# Patient Record
Sex: FEMALE | Race: BLACK OR AFRICAN AMERICAN | NOT HISPANIC OR LATINO | URBAN - METROPOLITAN AREA
[De-identification: names, ages, dates, MRNs, and addresses within clinical notes are randomized per-mention and may not be internally consistent; named-entity substitution may affect disease eponyms.]

---

## 2023-06-24 LAB
CHLAMYDIA TRACH., AMPLIFIED: NEGATIVE
N. GONORRHEA, AMPLIFIED: NEGATIVE
TRICHOMONAS VAGINALIS: NEGATIVE

## 2024-05-21 ENCOUNTER — OFFICE VISIT (OUTPATIENT)
Dept: PRIMARY CARE | Facility: CLINIC | Age: 29
End: 2024-05-21
Payer: COMMERCIAL

## 2024-05-21 VITALS
OXYGEN SATURATION: 98 % | TEMPERATURE: 97.2 F | HEART RATE: 90 BPM | SYSTOLIC BLOOD PRESSURE: 111 MMHG | DIASTOLIC BLOOD PRESSURE: 75 MMHG | HEIGHT: 68 IN | BODY MASS INDEX: 22.73 KG/M2 | WEIGHT: 150 LBS

## 2024-05-21 DIAGNOSIS — Z00.00 HEALTHCARE MAINTENANCE: Primary | ICD-10-CM

## 2024-05-21 DIAGNOSIS — J30.2 SEASONAL ALLERGIES: ICD-10-CM

## 2024-05-21 PROBLEM — R87.610 ASCUS WITH POSITIVE HIGH RISK HPV CERVICAL: Status: RESOLVED | Noted: 2024-05-21 | Resolved: 2024-05-21

## 2024-05-21 PROBLEM — R87.810 ASCUS WITH POSITIVE HIGH RISK HPV CERVICAL: Status: RESOLVED | Noted: 2024-05-21 | Resolved: 2024-05-21

## 2024-05-21 PROCEDURE — 1036F TOBACCO NON-USER: CPT | Performed by: STUDENT IN AN ORGANIZED HEALTH CARE EDUCATION/TRAINING PROGRAM

## 2024-05-21 PROCEDURE — 99395 PREV VISIT EST AGE 18-39: CPT | Performed by: STUDENT IN AN ORGANIZED HEALTH CARE EDUCATION/TRAINING PROGRAM

## 2024-05-21 RX ORDER — NORELGESTROMIN AND ETHINYL ESTRADIOL 150; 35 UG/D; UG/D
1 PATCH TRANSDERMAL
COMMUNITY

## 2024-05-21 RX ORDER — LORATADINE 10 MG/1
10 TABLET ORAL DAILY
Qty: 30 TABLET | Refills: 11 | Status: SHIPPED | OUTPATIENT
Start: 2024-05-21 | End: 2025-05-21

## 2024-05-21 ASSESSMENT — PAIN SCALES - GENERAL: PAINLEVEL: 0-NO PAIN

## 2024-05-21 NOTE — PROGRESS NOTES
"Subjective   Patient ID: Charley Saravia is a 28 y.o. female who presents for No chief complaint on file..    HPI   Life/social: Has been about 3 years since last in.  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 Now 2 children, Donovan age 10, Fatou age 2. Has cut back substantially on EtOH. Not smoking. Reports that she cut back drastically on her MJ use. Regular exercise.     Re: seasonal allergies - requesting a script for antihistamine as her allergies are flaring here in the spring. Has upcoming travel to [place] for her birthday and she wants her allergies under control prior to her trip.    Re: HM - Mamm not yet due. CRS not yet due. Declines shots.     PMHx, FHx, Social Hx, Surg Hx personally reviewed at this appointment. No pertinent findings and/or changes from prior (if applicable).    ROS: Denies wt gain/loss f/c HA LoC CP SOB NVDC. See HPI above, and scanned sheet (if applicable). All other systems are reviewed and are without complaint.     Review of Systems    Objective   /75   Pulse 90   Temp 36.2 °C (97.2 °F)   Ht 1.727 m (5' 8\")   Wt 68 kg (150 lb)   SpO2 98%   BMI 22.81 kg/m²     Physical Exam  Gen: well developed in NAD. AAO x3.  HEENT: NC/AT. Anicteric sclera, symmetric pupils. MMM no thrush. Mild cerumen buildup in L ear, < 25% blocked however.   Neck: Soft, supple. No LAD. No goiter.   CV: RRR nl s1s2 no m/r/g  Pulm: CTAB no w/r/r, good air exchange  GI: soft NTND BS+ no hsm  Ext: WWP no edema  Neuro: II-XII grossly intact, nonfocal systemic findings  MSK: 5/5 strength b/l UE and LE  Gait: unremarkable    Lab Results   Component Value Date    WBC 8.6 09/18/2021    HGB 11.9 (L) 09/18/2021    HCT 35.0 (L) 09/18/2021     (L) 09/18/2021    CHOL 190 07/17/2019    TRIG 60 07/17/2019    HDL 66.7 07/17/2019    ALT 13 07/17/2019    AST 16 07/17/2019     07/17/2019    K 3.9 07/17/2019     07/17/2019    CREATININE 0.92 07/17/2019    BUN 9 07/17/2019 "    CO2 24 2019    TSH 0.87 2019    INR 0.9 2021     par    US FETAL BIOPHYSICAL PROFILE WO NON STRESS TESTING  Indication  ========     Growth for Suspected Problem with Fetal Growth, Previous    Section, Abnormal Ultrasound  Finding on Current or Previous Ultrasound     History  ======     General History  Rhesus:  Rh positive  Smoking:  No  Height  170 cm  Height (ft)  5 ft  Height (in)  7 in  Previous Outcomes    3  Para  1  Gracia children born living (T)  1  Gracia children born (T)  1  Gracia children born (P)  1  Gracia living children (L)  2  Gracia children born living (P)  1  Other:  Previous  Section     Maternal Assessment  =================     Height  170 cm  Height (ft)  5 ft  Height (in)  7 in  Weight  73 kg  Weight (lb)  161 lb  Weight gain  0 kg  Weight gain (lb)  0 lb  BMI  25.21 kg/m?  Physical Exam  Initial weight (lb)  161 lb     Pregnancy  =========     Gracia pregnancy. Number of fetuses: 1     Dating  ======     Cycle:  irregular cycle, LMP date uncertain  Conception:  LMP  Ultrasound examination on:  2021  GA by U/S based upon:  AC, BPD, Femur, HC  GA by U/S  30 w + 6 d  CHANELLE by U/S:  2021  Assigned:  based on ultrasound (CRL), selected on 02/10/2021  Assigned GA  31 w + 0 d  Assigned CHANELLE:  2021  Pregnancy length  280 d     Fetal Growth Overview  =================     Exam date        GA              BPD (mm)          HC (mm)                AC (mm)               FL  (mm)             HL (mm)            EFW (g)  2021        19w 0d        43.2     54%        161.0    38%          138.8     56%        31.7     74%        31.0     84%        296    74%  2021        28w 0d        69.2     32%        253.3    10%          240.4     53%        57.7     90%                                  1,310    73%  2021        31w 0d        74.9     14%        273.5    3%            270.6    51%         62.2     70%                                  1,760     52%     Impression  =========     Follow up evaluation performed. The patient had a 26-week    due to P PROM and  chorioamnionitis. There was a question of a mild pericardial effusion   on the last  evaluation.  -Normal interval fetal growth  -No malformations were identified on a limited survey  - There is no evidence of a significant pericardial effusion.  The patient is aware of the above information.  Thank you for allowing us to participate in the care of your patient.     Follow-up  ========     Follow-up as clinically indicated     General Evaluation  ==============     Cardiac activity present.  bpm.  Fetal movements visualized.  Presentation cephalic.  Placenta Placental site: posterior, No Previa Seen.  Umbilical cord Cord vessels: 3 vessel cord.  Amniotic fluid MVP 4.7 cm. TINO 13.3 cm. Q1 2.4 cm, Q2 3.8 cm, Q3 4.7   cm, Q4 2.4 cm.     Fetal Biometry  ============     Standard  BPD  74.9 mm  30w 0d        14%        Hadlock     OFD  96.9 mm          16%        INTERGROWTH-21st     HC  273.5 mm  29w 6d        3%        Hadlock     Cerebellum tr  38.4 mm  31w 2d        38%        Hill     AC  270.6 mm  31w 1d        51%        Hadlock     Femur  62.2 mm  32w 2d        70%        Hadlock     HC / AC  1.01     EFW  1,760 g  31w 0d        52%        Hadlock     EFW (lb)  3 lb  EFW (oz)  14 oz  EFW by:  Hadlock (BPD-AC-FL)  Extended    3.3 mm  Head / Face / Neck  Cephalic index  0.77          27%        Nicolaides     Extremities / Bony Struc  FL / BPD  0.83     FL / HC  0.23     FL / AC  0.23     Other Structures  FHR  147 bpm     Fetal Anatomy  ===========     Cranium:  Normal  Lateral ventricles:  Normal  Midline falx:  Normal  Cavum septi pellucidi:  Normal  Cerebellum:  Normal  Cisterna magna:  Normal  Head / Neck  Rt lateral ventricle:  Normal  Lt lateral ventricle:  Normal  Thalami:  Normal  Cerebellar lobes:  Normal  Lips:  Normal  Nose:   Normal  4-chamber view:  abnormal  RVOT view:  Normal  LVOT view:  abnormal  Heart / Thorax  4-chamber view:  VSD  LVOT:  VSD  Situs:  Normal  3-vessel view:  Normal  Cardiac axis:  Normal  Cardiac size:  Normal  Cardiac rhythm:  Normal  Diaphragm:  Normal  Stomach:  Normal  Kidneys:  Normal  Bladder:  visualized  Abdomen  Stomach:  correct situs  Rt kidney:  Normal  Lt kidney:  Normal  Large bowel:  Normal  Gender:  female  Wants to know gender:  yes     Biophysical Profile  ==============     2: Fetal breathing movements  2: Gross body movements  2: Fetal tone  2: Amniotic fluid volume   Biophysical profile score  Interpretation: normal     Maternal Structures  ===============     Uterus / Cervix  Uterus:  Visualized  Cervix:  Not visualized  Ovaries / Tubes / Adnexa  Rt ovary:  Visualized  Rt ovary details:  Normal  Lt ovary:  Visualized  Lt ovary details:  Normal     Method  ======     Transabdominal ultrasound examination. View: Suboptimal view: limited   by late gestational age  US OB FOLLOW UP TRANSABDOMINAL APPROACH  Indication  ========     Growth for Suspected Problem with Fetal Growth, Previous    Section, Abnormal Ultrasound  Finding on Current or Previous Ultrasound     History  ======     General History  Rhesus:  Rh positive  Smoking:  No  Height  170 cm  Height (ft)  5 ft  Height (in)  7 in  Previous Outcomes    3  Para  1  Gracia children born living (T)  1  Gracia children born (T)  1  Gracia children born (P)  1  Gracia living children (L)  2  Gracia children born living (P)  1  Other:  Previous  Section     Maternal Assessment  =================     Height  170 cm  Height (ft)  5 ft  Height (in)  7 in  Weight  73 kg  Weight (lb)  161 lb  Weight gain  0 kg  Weight gain (lb)  0 lb  BMI  25.21 kg/m?  Physical Exam  Initial weight (lb)  161 lb     Pregnancy  =========     Gracia pregnancy. Number of fetuses: 1     Dating  ======     Cycle:  irregular cycle,  LMP date uncertain  Conception:  LMP  Ultrasound examination on:  2021  GA by U/S based upon:  AC, BPD, Femur, HC  GA by U/S  30 w + 6 d  CHANELLE by U/S:  2021  Assigned:  based on ultrasound (CRL), selected on 02/10/2021  Assigned GA  31 w + 0 d  Assigned CHANELLE:  2021  Pregnancy length  280 d     Fetal Growth Overview  =================     Exam date        GA              BPD (mm)          HC (mm)                AC (mm)               FL  (mm)             HL (mm)            EFW (g)  2021        19w 0d        43.2     54%        161.0    38%          138.8     56%        31.7     74%        31.0     84%        296    74%  2021        28w 0d        69.2     32%        253.3    10%          240.4     53%        57.7     90%                                  1,310    73%  2021        31w 0d        74.9     14%        273.5    3%            270.6    51%         62.2     70%                                 1,760     52%     Impression  =========     Follow up evaluation performed. The patient had a 26-week    due to P PROM and  chorioamnionitis. There was a question of a mild pericardial effusion   on the last  evaluation.  -Normal interval fetal growth  -No malformations were identified on a limited survey  - There is no evidence of a significant pericardial effusion.  The patient is aware of the above information.  Thank you for allowing us to participate in the care of your patient.     Follow-up  ========     Follow-up as clinically indicated     General Evaluation  ==============     Cardiac activity present.  bpm.  Fetal movements visualized.  Presentation cephalic.  Placenta Placental site: posterior, No Previa Seen.  Umbilical cord Cord vessels: 3 vessel cord.  Amniotic fluid MVP 4.7 cm. TINO 13.3 cm. Q1 2.4 cm, Q2 3.8 cm, Q3 4.7   cm, Q4 2.4 cm.     Fetal Biometry  ============     Standard  BPD  74.9 mm  30w 0d        14%        Hadlock     OFD  96.9 mm          16%         INTERGROWTH-21st     HC  273.5 mm  29w 6d        3%        Hadlock     Cerebellum tr  38.4 mm  31w 2d        38%        Hill     AC  270.6 mm  31w 1d        51%        Hadlock     Femur  62.2 mm  32w 2d        70%        Hadlock     HC / AC  1.01     EFW  1,760 g  31w 0d        52%        Hadlock     EFW (lb)  3 lb  EFW (oz)  14 oz  EFW by:  Hadlock (BPD-AC-FL)  Extended    3.3 mm  Head / Face / Neck  Cephalic index  0.77          27%        Nicolaides     Extremities / Bony Struc  FL / BPD  0.83     FL / HC  0.23     FL / AC  0.23     Other Structures  FHR  147 bpm     Fetal Anatomy  ===========     Cranium:  Normal  Lateral ventricles:  Normal  Midline falx:  Normal  Cavum septi pellucidi:  Normal  Cerebellum:  Normal  Cisterna magna:  Normal  Head / Neck  Rt lateral ventricle:  Normal  Lt lateral ventricle:  Normal  Thalami:  Normal  Cerebellar lobes:  Normal  Lips:  Normal  Nose:  Normal  4-chamber view:  abnormal  RVOT view:  Normal  LVOT view:  abnormal  Heart / Thorax  4-chamber view:  VSD  LVOT:  VSD  Situs:  Normal  3-vessel view:  Normal  Cardiac axis:  Normal  Cardiac size:  Normal  Cardiac rhythm:  Normal  Diaphragm:  Normal  Stomach:  Normal  Kidneys:  Normal  Bladder:  visualized  Abdomen  Stomach:  correct situs  Rt kidney:  Normal  Lt kidney:  Normal  Large bowel:  Normal  Gender:  female  Wants to know gender:  yes     Biophysical Profile  ==============     2: Fetal breathing movements  2: Gross body movements  2: Fetal tone  2: Amniotic fluid volume  8/8 Biophysical profile score  Interpretation: normal     Maternal Structures  ===============     Uterus / Cervix  Uterus:  Visualized  Cervix:  Not visualized  Ovaries / Tubes / Adnexa  Rt ovary:  Visualized  Rt ovary details:  Normal  Lt ovary:  Visualized  Lt ovary details:  Normal     Method  ======     Transabdominal ultrasound examination. View: Suboptimal view: limited   by late gestational age         Assessment/Plan   Stable health, has  cut down drastically on her alcohol use and MJ use.     # Allergies  - renew H2 blocker    # Health Maintenance  - routine blood work  - Colon Cancer Screening: Not yet indicated   - Mammogram: Not yet indicated   - DEXA: Not yet indicated   - Immunizations: declines

## 2024-05-21 NOTE — PATIENT INSTRUCTIONS
Please stop at the lab ([address]) to complete your blood and/or urine work that I've ordered for you.    I will contact you with the results at my soonest convenience. I strongly urge you to use [employer] as this is the quickest and easiest way to access your results and receive my correspondences.    I have renewed your chronic medications today.     See me yearly for a complete physical exam, and sooner as needed for sick visits

## 2024-06-28 ENCOUNTER — APPOINTMENT (OUTPATIENT)
Dept: OBSTETRICS AND GYNECOLOGY | Facility: CLINIC | Age: 29
End: 2024-06-28
Payer: COMMERCIAL

## 2024-07-06 DIAGNOSIS — Z78.9 OTHER SPECIFIED HEALTH STATUS: ICD-10-CM

## 2024-07-08 RX ORDER — NORELGESTROMIN AND ETHINYL ESTRADIOL 150; 35 UG/D; UG/D
1 PATCH TRANSDERMAL
Qty: 9 PATCH | Refills: 3 | Status: SHIPPED | OUTPATIENT
Start: 2024-07-14

## 2024-10-04 ENCOUNTER — APPOINTMENT (OUTPATIENT)
Dept: OBSTETRICS AND GYNECOLOGY | Facility: CLINIC | Age: 29
End: 2024-10-04
Payer: COMMERCIAL

## 2025-05-15 ENCOUNTER — APPOINTMENT (OUTPATIENT)
Facility: CLINIC | Age: 30
End: 2025-05-15
Payer: COMMERCIAL

## 2025-05-20 ENCOUNTER — APPOINTMENT (OUTPATIENT)
Facility: CLINIC | Age: 30
End: 2025-05-20
Payer: MEDICAID

## 2025-05-20 VITALS
WEIGHT: 157 LBS | HEART RATE: 83 BPM | HEIGHT: 69 IN | SYSTOLIC BLOOD PRESSURE: 108 MMHG | DIASTOLIC BLOOD PRESSURE: 75 MMHG | BODY MASS INDEX: 23.25 KG/M2

## 2025-05-20 DIAGNOSIS — Z78.9 OTHER SPECIFIED HEALTH STATUS: ICD-10-CM

## 2025-05-20 DIAGNOSIS — Z01.419 ENCOUNTER FOR GYNECOLOGICAL EXAMINATION WITHOUT ABNORMAL FINDING: Primary | ICD-10-CM

## 2025-05-20 PROCEDURE — 87626 HPV SEP HI-RSK TYP&POOL RSLT: CPT

## 2025-05-20 PROCEDURE — 87591 N.GONORRHOEAE DNA AMP PROB: CPT

## 2025-05-20 PROCEDURE — 87491 CHLMYD TRACH DNA AMP PROBE: CPT

## 2025-05-20 PROCEDURE — 87661 TRICHOMONAS VAGINALIS AMPLIF: CPT

## 2025-05-20 PROCEDURE — 3008F BODY MASS INDEX DOCD: CPT | Performed by: OBSTETRICS & GYNECOLOGY

## 2025-05-20 PROCEDURE — 99395 PREV VISIT EST AGE 18-39: CPT | Performed by: OBSTETRICS & GYNECOLOGY

## 2025-05-20 PROCEDURE — 88175 CYTOPATH C/V AUTO FLUID REDO: CPT

## 2025-05-20 RX ORDER — NORELGESTROMIN AND ETHINYL ESTRADIOL 150; 35 UG/D; UG/D
1 PATCH TRANSDERMAL
Qty: 9 PATCH | Refills: 3 | Status: SHIPPED | OUTPATIENT
Start: 2025-05-20

## 2025-05-20 ASSESSMENT — PATIENT HEALTH QUESTIONNAIRE - PHQ9
1. LITTLE INTEREST OR PLEASURE IN DOING THINGS: NOT AT ALL
SUM OF ALL RESPONSES TO PHQ9 QUESTIONS 1 AND 2: 0
2. FEELING DOWN, DEPRESSED OR HOPELESS: NOT AT ALL

## 2025-05-20 ASSESSMENT — COLUMBIA-SUICIDE SEVERITY RATING SCALE - C-SSRS
2. HAVE YOU ACTUALLY HAD ANY THOUGHTS OF KILLING YOURSELF?: NO
6. HAVE YOU EVER DONE ANYTHING, STARTED TO DO ANYTHING, OR PREPARED TO DO ANYTHING TO END YOUR LIFE?: NO
1. IN THE PAST MONTH, HAVE YOU WISHED YOU WERE DEAD OR WISHED YOU COULD GO TO SLEEP AND NOT WAKE UP?: NO

## 2025-05-20 ASSESSMENT — ENCOUNTER SYMPTOMS
LOSS OF SENSATION IN FEET: 0
OCCASIONAL FEELINGS OF UNSTEADINESS: 0
DEPRESSION: 0

## 2025-05-20 NOTE — PROGRESS NOTES
Subjective   Charley Saravia is a 29 y.o. female here for a routine exam.  She is having regular cycles.  She wishes to resume Xulane patch, she has been off for about a year.  She was last seen here 2023.    She denies discharge, no dysuria or change in bowel habits.  Her daughter, Fatou, is here today.    Personal health questionnaire reviewed: yes.     Gynecologic History  Patient's last menstrual period was 2025 (approximate).  Contraception: none  Last Pap: 21. Results were: normal.    Obstetric History  OB History    Para Term  AB Living   3 3       SAB IAB Ectopic Multiple Live Births             # Outcome Date GA Lbr Pratik/2nd Weight Sex Type Anes PTL Lv   3 Para            2 Para            1 Para               Obstetric Comments   Second pregnancy was delivered via csection- passed away.       Objective   Constitutional: Alert and in no acute distress. Well developed, well nourished.   Head and Face: Head and face: Normal.    Eyes: Normal external exam - nonicteric sclera, extraocular movements intact (EOMI) and no ptosis.   Neck: No neck asymmetry. Supple. Thyroid not enlarged and there were no palpable thyroid nodules.    Pulmonary: No respiratory distress.   Chest: Breasts: Normal appearance, no nipple discharge and no skin changes. Palpation of breasts and axillae: No palpable mass and no axillary lymphadenopathy.   Abdomen: Soft nontender; no abdominal mass palpated. No organomegaly. No hernias.   Genitourinary: External genitalia: Normal. No inguinal lymphadenopathy. Bartholin's Urethral and Skenes Glands: Normal. Urethra: Normal.  Bladder: Normal on palpation. Vagina: Normal. Cervix: Normal.  Uterus: Normal.  Right Adnexa/parametria: Normal.  Left Adnexa/parametria: Normal.    Musculoskeletal: No joint swelling seen, normal movements of all extremities.   Skin: Normal skin color and pigmentation, normal skin turgor, and no rash.   Neurologic: Non-focal. Grossly intact.    Psychiatric: Alert and oriented x 3. Affect normal to patient baseline. Mood: Appropriate.  Physical Exam     Assessment/Plan   Healthy female exam.  This is a 29-year-old female with a normal exam.  A Pap smear was sent, including cultures for chlamydia, gonorrhea and trichomonas.    She wishes to resume the Xulane patch and this was ordered to the pharmacy.  We discussed it will not be effective for contraception for at least 2 weeks.  It may take 3 months to adjust to the patch contraception again.    I will see her routinely in 1 year.  Education reviewed: self breast exams.  Contraception: Xulane patch.

## 2025-05-21 LAB
C TRACH RRNA SPEC QL NAA+PROBE: NEGATIVE
N GONORRHOEA DNA SPEC QL PROBE+SIG AMP: NEGATIVE
T VAGINALIS RRNA SPEC QL NAA+PROBE: NEGATIVE

## 2025-06-06 DIAGNOSIS — B37.31 VAGINAL YEAST INFECTION: Primary | ICD-10-CM

## 2025-06-06 LAB
CYTOLOGY CMNT CVX/VAG CYTO-IMP: NORMAL
HPV HR 12 DNA GENITAL QL NAA+PROBE: NEGATIVE
HPV HR GENOTYPES PNL CVX NAA+PROBE: NEGATIVE
HPV16 DNA SPEC QL NAA+PROBE: NEGATIVE
HPV18 DNA SPEC QL NAA+PROBE: NEGATIVE
LAB AP HPV GENOTYPE QUESTION: YES
LAB AP HPV HR: NORMAL
LAB AP PAP ADDITIONAL TESTS: NORMAL
LABORATORY COMMENT REPORT: NORMAL
LMP START DATE: NORMAL
PATH REPORT.TOTAL CANCER: NORMAL

## 2025-06-06 RX ORDER — FLUCONAZOLE 150 MG/1
150 TABLET ORAL ONCE
Qty: 2 TABLET | Refills: 1 | Status: SHIPPED | OUTPATIENT
Start: 2025-06-06 | End: 2025-06-06

## 2025-07-22 ENCOUNTER — TELEPHONE (OUTPATIENT)
Facility: CLINIC | Age: 30
End: 2025-07-22

## 2025-07-22 DIAGNOSIS — Z78.9 OTHER SPECIFIED HEALTH STATUS: ICD-10-CM

## 2025-07-22 RX ORDER — NORELGESTROMIN AND ETHINYL ESTRADIOL 150; 35 UG/D; UG/D
1 PATCH TRANSDERMAL
Status: CANCELLED | OUTPATIENT
Start: 2025-07-22

## 2025-07-22 RX ORDER — NORELGESTROMIN AND ETHINYL ESTRADIOL 150; 35 UG/D; UG/D
1 PATCH TRANSDERMAL
Qty: 9 PATCH | Refills: 4 | Status: SHIPPED | OUTPATIENT
Start: 2025-07-22

## 2025-07-25 ENCOUNTER — HOSPITAL ENCOUNTER (OUTPATIENT)
Facility: HOSPITAL | Age: 30
Setting detail: OBSERVATION
Discharge: ANOTHER HOSPITAL | End: 2025-07-26
Attending: EMERGENCY MEDICINE | Admitting: STUDENT IN AN ORGANIZED HEALTH CARE EDUCATION/TRAINING PROGRAM
Payer: MEDICAID

## 2025-07-25 DIAGNOSIS — H53.9 VISUAL DISTURBANCE: Primary | ICD-10-CM

## 2025-07-25 DIAGNOSIS — D64.9 ANEMIA, UNSPECIFIED TYPE: ICD-10-CM

## 2025-07-25 DIAGNOSIS — H53.8 BLURRED VISION, RIGHT EYE: ICD-10-CM

## 2025-07-25 LAB
ABO GROUP (TYPE) IN BLOOD: NORMAL
ABO GROUP (TYPE) IN BLOOD: NORMAL
ANION GAP SERPL CALC-SCNC: 10 MMOL/L (ref 10–20)
ANTIBODY SCREEN: NORMAL
B-HCG SERPL-ACNC: <2 MIU/ML
BASOPHILS # BLD MANUAL: 0 X10*3/UL (ref 0–0.1)
BASOPHILS NFR BLD MANUAL: 0 %
BUN SERPL-MCNC: 7 MG/DL (ref 6–23)
CALCIUM SERPL-MCNC: 8.8 MG/DL (ref 8.6–10.3)
CHLORIDE SERPL-SCNC: 110 MMOL/L (ref 98–107)
CO2 SERPL-SCNC: 23 MMOL/L (ref 21–32)
CREAT SERPL-MCNC: 0.76 MG/DL (ref 0.5–1.05)
EGFRCR SERPLBLD CKD-EPI 2021: >90 ML/MIN/1.73M*2
EOSINOPHIL # BLD MANUAL: 0.17 X10*3/UL (ref 0–0.7)
EOSINOPHIL NFR BLD MANUAL: 2 %
ERYTHROCYTE [DISTWIDTH] IN BLOOD BY AUTOMATED COUNT: 25.4 % (ref 11.5–14.5)
GLUCOSE SERPL-MCNC: 87 MG/DL (ref 74–99)
HCT VFR BLD AUTO: 22 % (ref 36–46)
HGB BLD-MCNC: 5.9 G/DL (ref 12–16)
HYPOCHROMIA BLD QL SMEAR: ABNORMAL
IMM GRANULOCYTES # BLD AUTO: 0.02 X10*3/UL (ref 0–0.7)
IMM GRANULOCYTES NFR BLD AUTO: 0.2 % (ref 0–0.9)
LYMPHOCYTES # BLD MANUAL: 2.66 X10*3/UL (ref 1.2–4.8)
LYMPHOCYTES NFR BLD MANUAL: 32 %
MCH RBC QN AUTO: 17.8 PG (ref 26–34)
MCHC RBC AUTO-ENTMCNC: 26.8 G/DL (ref 32–36)
MCV RBC AUTO: 67 FL (ref 80–100)
MONOCYTES # BLD MANUAL: 0 X10*3/UL (ref 0.1–1)
MONOCYTES NFR BLD MANUAL: 0 %
NEUTS SEG # BLD MANUAL: 5.48 X10*3/UL (ref 1.2–7)
NEUTS SEG NFR BLD MANUAL: 66 %
NRBC BLD-RTO: 0.2 /100 WBCS (ref 0–0)
PLATELET # BLD AUTO: 234 X10*3/UL (ref 150–450)
POLYCHROMASIA BLD QL SMEAR: ABNORMAL
POTASSIUM SERPL-SCNC: 3.9 MMOL/L (ref 3.5–5.3)
RBC # BLD AUTO: 3.31 X10*6/UL (ref 4–5.2)
RBC MORPH BLD: ABNORMAL
RH FACTOR (ANTIGEN D): NORMAL
RH FACTOR (ANTIGEN D): NORMAL
SCHISTOCYTES BLD QL SMEAR: ABNORMAL
SODIUM SERPL-SCNC: 139 MMOL/L (ref 136–145)
TOTAL CELLS COUNTED BLD: 100
WBC # BLD AUTO: 8.3 X10*3/UL (ref 4.4–11.3)

## 2025-07-25 PROCEDURE — 99291 CRITICAL CARE FIRST HOUR: CPT | Mod: 25 | Performed by: EMERGENCY MEDICINE

## 2025-07-25 PROCEDURE — 85007 BL SMEAR W/DIFF WBC COUNT: CPT

## 2025-07-25 PROCEDURE — 80048 BASIC METABOLIC PNL TOTAL CA: CPT

## 2025-07-25 PROCEDURE — 85027 COMPLETE CBC AUTOMATED: CPT | Mod: 59

## 2025-07-25 PROCEDURE — 36415 COLL VENOUS BLD VENIPUNCTURE: CPT

## 2025-07-25 PROCEDURE — 86923 COMPATIBILITY TEST ELECTRIC: CPT | Mod: 59

## 2025-07-25 PROCEDURE — 84702 CHORIONIC GONADOTROPIN TEST: CPT

## 2025-07-25 PROCEDURE — 86901 BLOOD TYPING SEROLOGIC RH(D): CPT

## 2025-07-25 PROCEDURE — 99285 EMERGENCY DEPT VISIT HI MDM: CPT | Performed by: EMERGENCY MEDICINE

## 2025-07-25 SDOH — SOCIAL STABILITY: SOCIAL INSECURITY: WERE YOU ABLE TO COMPLETE ALL THE BEHAVIORAL HEALTH SCREENINGS?: YES

## 2025-07-25 SDOH — SOCIAL STABILITY: SOCIAL INSECURITY: HAVE YOU HAD THOUGHTS OF HARMING ANYONE ELSE?: NO

## 2025-07-25 ASSESSMENT — LIFESTYLE VARIABLES
HOW OFTEN DO YOU HAVE 6 OR MORE DRINKS ON ONE OCCASION: NEVER
HOW MANY STANDARD DRINKS CONTAINING ALCOHOL DO YOU HAVE ON A TYPICAL DAY: 1 OR 2
AUDIT-C TOTAL SCORE: 1
HOW OFTEN DO YOU HAVE A DRINK CONTAINING ALCOHOL: MONTHLY OR LESS
AUDIT-C TOTAL SCORE: 1
SKIP TO QUESTIONS 9-10: 1

## 2025-07-25 ASSESSMENT — PAIN SCALES - GENERAL
PAINLEVEL_OUTOF10: 0 - NO PAIN
PAINLEVEL_OUTOF10: 0 - NO PAIN

## 2025-07-25 ASSESSMENT — PAIN - FUNCTIONAL ASSESSMENT: PAIN_FUNCTIONAL_ASSESSMENT: 0-10

## 2025-07-25 ASSESSMENT — PATIENT HEALTH QUESTIONNAIRE - PHQ9
SUM OF ALL RESPONSES TO PHQ9 QUESTIONS 1 & 2: 0
2. FEELING DOWN, DEPRESSED OR HOPELESS: NOT AT ALL
1. LITTLE INTEREST OR PLEASURE IN DOING THINGS: NOT AT ALL

## 2025-07-25 NOTE — ED TRIAGE NOTES
TRIAGE NOTE   I saw the patient as the Clinician in Triage and performed a brief history and physical exam, established acuity, and ordered appropriate tests to develop basic plan of care. Patient will be seen by an [facility], resident and/or physician who will independently evaluate the patient. Please see subsequent provider notes for further details and disposition.     Brief HPI: In brief, Charley Saravia is a 30 y.o. female that presents for vision in and out in the right eye. Doc says poss optic nerve swollen. Dx amarosis fugax.  Patient reports that has been going on for months.    Focused Physical exam:   NIH 0     Plan/MDM:   Initiating CBC, BMP, hCG.  Patient will be seen in the back to the ED for further evaluation and treatment.  I will not be following with this patient during her ED visit.  This is a preliminary evaluation during triage.    I evaluated this patient in triage with the RN. Due to the patients complaint labs and or imaging were ordered by myself in an attempt to expedite patient care however I am not participating in care after evaluation. This is a preliminary assessment. Pt does not appear in acute distress at this time. They will have a full evaluation as soon as possible. They will be cared for by another provider who will possibly order more labs, imaging or interventions. Pt did not have a full ROS or PE completed by myself however below is a summary with reasons for orders.  For the remainder of the patient's workup and ED course, please refer to the main ED provider note. We discussed need for diagnostic testing including laboratory studies and imaging.  We also discussed that patient may be asked to wait in the waiting room while these tests are pending.  They understand that if they choose to leave without having the testing completed or resulted that we cannot rule out acute life threatening illnesses and the risks involved could lead to worsening condition, permanent disability or  even death.     Please see subsequent provider note for further details and disposition

## 2025-07-25 NOTE — ED TRIAGE NOTES
Pt at optho appointment and dx with right optic nerve edema. Pt sent for further evaluation and to have MRI

## 2025-07-26 ENCOUNTER — APPOINTMENT (OUTPATIENT)
Dept: RADIOLOGY | Facility: HOSPITAL | Age: 30
End: 2025-07-26
Payer: MEDICAID

## 2025-07-26 ENCOUNTER — HOSPITAL ENCOUNTER (OUTPATIENT)
Facility: HOSPITAL | Age: 30
Setting detail: OBSERVATION
Discharge: HOME | End: 2025-07-27
Attending: EMERGENCY MEDICINE | Admitting: EMERGENCY MEDICINE
Payer: MEDICAID

## 2025-07-26 VITALS
DIASTOLIC BLOOD PRESSURE: 72 MMHG | OXYGEN SATURATION: 100 % | HEIGHT: 69 IN | HEART RATE: 80 BPM | BODY MASS INDEX: 22.96 KG/M2 | WEIGHT: 155 LBS | RESPIRATION RATE: 17 BRPM | SYSTOLIC BLOOD PRESSURE: 117 MMHG | TEMPERATURE: 97.7 F

## 2025-07-26 DIAGNOSIS — D50.0 IRON DEFICIENCY ANEMIA DUE TO CHRONIC BLOOD LOSS: ICD-10-CM

## 2025-07-26 DIAGNOSIS — H53.121 TRANSIENT VISUAL LOSS OF RIGHT EYE: Primary | ICD-10-CM

## 2025-07-26 PROBLEM — H54.61 VISION LOSS OF RIGHT EYE: Status: ACTIVE | Noted: 2025-07-26

## 2025-07-26 PROBLEM — D64.9 ANEMIA: Status: ACTIVE | Noted: 2025-07-26

## 2025-07-26 LAB
ANION GAP SERPL CALC-SCNC: 12 MMOL/L (ref 10–20)
BLOOD EXPIRATION DATE: NORMAL
BLOOD EXPIRATION DATE: NORMAL
BUN SERPL-MCNC: 9 MG/DL (ref 6–23)
CALCIUM SERPL-MCNC: 8.4 MG/DL (ref 8.6–10.3)
CHLORIDE SERPL-SCNC: 110 MMOL/L (ref 98–107)
CHOLEST SERPL-MCNC: 131 MG/DL (ref 0–199)
CHOLESTEROL/HDL RATIO: 2.5
CO2 SERPL-SCNC: 21 MMOL/L (ref 21–32)
CREAT SERPL-MCNC: 0.92 MG/DL (ref 0.5–1.05)
DISPENSE STATUS: NORMAL
DISPENSE STATUS: NORMAL
EGFRCR SERPLBLD CKD-EPI 2021: 86 ML/MIN/1.73M*2
ERYTHROCYTE [DISTWIDTH] IN BLOOD BY AUTOMATED COUNT: 27.6 % (ref 11.5–14.5)
FERRITIN SERPL-MCNC: 9 NG/ML (ref 8–150)
FOLATE SERPL-MCNC: 12.6 NG/ML
GLUCOSE SERPL-MCNC: 92 MG/DL (ref 74–99)
HCT VFR BLD AUTO: 28.6 % (ref 36–46)
HDLC SERPL-MCNC: 51.9 MG/DL
HEMOCCULT SP1 STL QL: NEGATIVE
HGB BLD-MCNC: 8.2 G/DL (ref 12–16)
IRON SATN MFR SERPL: ABNORMAL %
IRON SERPL-MCNC: 19 UG/DL (ref 35–150)
LDLC SERPL CALC-MCNC: 57 MG/DL
MCH RBC QN AUTO: 21.1 PG (ref 26–34)
MCHC RBC AUTO-ENTMCNC: 28.7 G/DL (ref 32–36)
MCV RBC AUTO: 74 FL (ref 80–100)
NON HDL CHOLESTEROL: 79 MG/DL (ref 0–149)
NRBC BLD-RTO: 0 /100 WBCS (ref 0–0)
PLATELET # BLD AUTO: 229 X10*3/UL (ref 150–450)
POTASSIUM SERPL-SCNC: 4.2 MMOL/L (ref 3.5–5.3)
PRODUCT BLOOD TYPE: 6200
PRODUCT BLOOD TYPE: 6200
PRODUCT CODE: NORMAL
PRODUCT CODE: NORMAL
RBC # BLD AUTO: 3.88 X10*6/UL (ref 4–5.2)
SODIUM SERPL-SCNC: 139 MMOL/L (ref 136–145)
TIBC SERPL-MCNC: ABNORMAL UG/DL
TRIGL SERPL-MCNC: 111 MG/DL (ref 0–149)
UIBC SERPL-MCNC: >450 UG/DL (ref 110–370)
UNIT ABO: NORMAL
UNIT ABO: NORMAL
UNIT NUMBER: NORMAL
UNIT NUMBER: NORMAL
UNIT RH: NORMAL
UNIT RH: NORMAL
UNIT VOLUME: 350
UNIT VOLUME: 350
VLDL: 22 MG/DL (ref 0–40)
WBC # BLD AUTO: 10.2 X10*3/UL (ref 4.4–11.3)
XM INTEP: NORMAL
XM INTEP: NORMAL

## 2025-07-26 PROCEDURE — A9575 INJ GADOTERATE MEGLUMI 0.1ML: HCPCS | Mod: SE | Performed by: EMERGENCY MEDICINE

## 2025-07-26 PROCEDURE — 70547 MR ANGIOGRAPHY NECK W/O DYE: CPT | Performed by: RADIOLOGY

## 2025-07-26 PROCEDURE — 82270 OCCULT BLOOD FECES: CPT | Performed by: NURSE PRACTITIONER

## 2025-07-26 PROCEDURE — G0378 HOSPITAL OBSERVATION PER HR: HCPCS

## 2025-07-26 PROCEDURE — 70544 MR ANGIOGRAPHY HEAD W/O DYE: CPT

## 2025-07-26 PROCEDURE — 70544 MR ANGIOGRAPHY HEAD W/O DYE: CPT | Performed by: RADIOLOGY

## 2025-07-26 PROCEDURE — 99285 EMERGENCY DEPT VISIT HI MDM: CPT | Mod: 25 | Performed by: EMERGENCY MEDICINE

## 2025-07-26 PROCEDURE — 80048 BASIC METABOLIC PNL TOTAL CA: CPT | Performed by: NURSE PRACTITIONER

## 2025-07-26 PROCEDURE — 85027 COMPLETE CBC AUTOMATED: CPT | Performed by: NURSE PRACTITIONER

## 2025-07-26 PROCEDURE — 83718 ASSAY OF LIPOPROTEIN: CPT

## 2025-07-26 PROCEDURE — 70547 MR ANGIOGRAPHY NECK W/O DYE: CPT

## 2025-07-26 PROCEDURE — 36430 TRANSFUSION BLD/BLD COMPNT: CPT

## 2025-07-26 PROCEDURE — 36415 COLL VENOUS BLD VENIPUNCTURE: CPT | Performed by: NURSE PRACTITIONER

## 2025-07-26 PROCEDURE — 83036 HEMOGLOBIN GLYCOSYLATED A1C: CPT | Mod: AHULAB

## 2025-07-26 PROCEDURE — 83540 ASSAY OF IRON: CPT | Performed by: NURSE PRACTITIONER

## 2025-07-26 PROCEDURE — 70543 MRI ORBT/FAC/NCK W/O &W/DYE: CPT

## 2025-07-26 PROCEDURE — 2550000001 HC RX 255 CONTRASTS: Mod: SE | Performed by: EMERGENCY MEDICINE

## 2025-07-26 PROCEDURE — 70553 MRI BRAIN STEM W/O & W/DYE: CPT | Performed by: RADIOLOGY

## 2025-07-26 PROCEDURE — 70543 MRI ORBT/FAC/NCK W/O &W/DYE: CPT | Performed by: RADIOLOGY

## 2025-07-26 PROCEDURE — 70553 MRI BRAIN STEM W/O & W/DYE: CPT

## 2025-07-26 PROCEDURE — 99234 HOSP IP/OBS SM DT SF/LOW 45: CPT | Performed by: NURSE PRACTITIONER

## 2025-07-26 PROCEDURE — P9016 RBC LEUKOCYTES REDUCED: HCPCS

## 2025-07-26 PROCEDURE — 82728 ASSAY OF FERRITIN: CPT | Performed by: NURSE PRACTITIONER

## 2025-07-26 PROCEDURE — 2500000004 HC RX 250 GENERAL PHARMACY W/ HCPCS (ALT 636 FOR OP/ED): Performed by: NURSE PRACTITIONER

## 2025-07-26 PROCEDURE — 82746 ASSAY OF FOLIC ACID SERUM: CPT | Mod: AHULAB | Performed by: NURSE PRACTITIONER

## 2025-07-26 RX ORDER — SODIUM CHLORIDE 9 MG/ML
100 INJECTION, SOLUTION INTRAVENOUS CONTINUOUS
Status: DISCONTINUED | OUTPATIENT
Start: 2025-07-26 | End: 2025-07-26 | Stop reason: HOSPADM

## 2025-07-26 RX ORDER — GADOTERATE MEGLUMINE 376.9 MG/ML
15 INJECTION INTRAVENOUS
Status: COMPLETED | OUTPATIENT
Start: 2025-07-26 | End: 2025-07-26

## 2025-07-26 RX ORDER — ACETAMINOPHEN 160 MG/5ML
650 SOLUTION ORAL EVERY 4 HOURS PRN
Status: DISCONTINUED | OUTPATIENT
Start: 2025-07-26 | End: 2025-07-26 | Stop reason: HOSPADM

## 2025-07-26 RX ORDER — ONDANSETRON HYDROCHLORIDE 2 MG/ML
4 INJECTION, SOLUTION INTRAVENOUS EVERY 8 HOURS PRN
Status: DISCONTINUED | OUTPATIENT
Start: 2025-07-26 | End: 2025-07-26 | Stop reason: HOSPADM

## 2025-07-26 RX ORDER — PANTOPRAZOLE SODIUM 40 MG/1
40 TABLET, DELAYED RELEASE ORAL
OUTPATIENT
Start: 2025-07-27

## 2025-07-26 RX ORDER — ONDANSETRON HYDROCHLORIDE 2 MG/ML
4 INJECTION, SOLUTION INTRAVENOUS EVERY 8 HOURS PRN
OUTPATIENT
Start: 2025-07-26

## 2025-07-26 RX ORDER — POLYETHYLENE GLYCOL 3350 17 G/17G
17 POWDER, FOR SOLUTION ORAL DAILY PRN
Status: DISCONTINUED | OUTPATIENT
Start: 2025-07-26 | End: 2025-07-26 | Stop reason: HOSPADM

## 2025-07-26 RX ORDER — ONDANSETRON 4 MG/1
4 TABLET, FILM COATED ORAL EVERY 8 HOURS PRN
Status: DISCONTINUED | OUTPATIENT
Start: 2025-07-26 | End: 2025-07-26 | Stop reason: HOSPADM

## 2025-07-26 RX ORDER — ACETAMINOPHEN 650 MG/1
650 SUPPOSITORY RECTAL EVERY 4 HOURS PRN
OUTPATIENT
Start: 2025-07-26

## 2025-07-26 RX ORDER — SODIUM CHLORIDE 9 MG/ML
100 INJECTION, SOLUTION INTRAVENOUS CONTINUOUS
Status: CANCELLED | OUTPATIENT
Start: 2025-07-26 | End: 2025-07-27

## 2025-07-26 RX ORDER — PANTOPRAZOLE SODIUM 40 MG/10ML
40 INJECTION, POWDER, LYOPHILIZED, FOR SOLUTION INTRAVENOUS
Status: DISCONTINUED | OUTPATIENT
Start: 2025-07-26 | End: 2025-07-26 | Stop reason: HOSPADM

## 2025-07-26 RX ORDER — ACETAMINOPHEN 325 MG/1
650 TABLET ORAL EVERY 4 HOURS PRN
OUTPATIENT
Start: 2025-07-26

## 2025-07-26 RX ORDER — PANTOPRAZOLE SODIUM 40 MG/1
40 TABLET, DELAYED RELEASE ORAL
Status: DISCONTINUED | OUTPATIENT
Start: 2025-07-26 | End: 2025-07-26 | Stop reason: HOSPADM

## 2025-07-26 RX ORDER — ACETAMINOPHEN 325 MG/1
650 TABLET ORAL EVERY 4 HOURS PRN
Status: DISCONTINUED | OUTPATIENT
Start: 2025-07-26 | End: 2025-07-26 | Stop reason: HOSPADM

## 2025-07-26 RX ORDER — PANTOPRAZOLE SODIUM 40 MG/10ML
40 INJECTION, POWDER, LYOPHILIZED, FOR SOLUTION INTRAVENOUS
OUTPATIENT
Start: 2025-07-27

## 2025-07-26 RX ORDER — ACETAMINOPHEN 160 MG/5ML
650 SOLUTION ORAL EVERY 4 HOURS PRN
OUTPATIENT
Start: 2025-07-26

## 2025-07-26 RX ORDER — POLYETHYLENE GLYCOL 3350 17 G/17G
17 POWDER, FOR SOLUTION ORAL DAILY PRN
OUTPATIENT
Start: 2025-07-26

## 2025-07-26 RX ORDER — ONDANSETRON 4 MG/1
4 TABLET, FILM COATED ORAL EVERY 8 HOURS PRN
OUTPATIENT
Start: 2025-07-26

## 2025-07-26 RX ORDER — ACETAMINOPHEN 650 MG/1
650 SUPPOSITORY RECTAL EVERY 4 HOURS PRN
Status: DISCONTINUED | OUTPATIENT
Start: 2025-07-26 | End: 2025-07-26 | Stop reason: HOSPADM

## 2025-07-26 RX ADMIN — SODIUM CHLORIDE 100 ML/HR: 0.9 INJECTION, SOLUTION INTRAVENOUS at 05:58

## 2025-07-26 RX ADMIN — GADOTERATE MEGLUMINE 14 ML: 376.9 INJECTION INTRAVENOUS at 21:53

## 2025-07-26 SDOH — ECONOMIC STABILITY: FOOD INSECURITY: WITHIN THE PAST 12 MONTHS, THE FOOD YOU BOUGHT JUST DIDN'T LAST AND YOU DIDN'T HAVE MONEY TO GET MORE.: NEVER TRUE

## 2025-07-26 SDOH — SOCIAL STABILITY: SOCIAL INSECURITY
WITHIN THE LAST YEAR, HAVE YOU BEEN KICKED, HIT, SLAPPED, OR OTHERWISE PHYSICALLY HURT BY YOUR PARTNER OR EX-PARTNER?: NO

## 2025-07-26 SDOH — ECONOMIC STABILITY: FOOD INSECURITY: WITHIN THE PAST 12 MONTHS, YOU WORRIED THAT YOUR FOOD WOULD RUN OUT BEFORE YOU GOT THE MONEY TO BUY MORE.: NEVER TRUE

## 2025-07-26 SDOH — SOCIAL STABILITY: SOCIAL INSECURITY: WITHIN THE LAST YEAR, HAVE YOU BEEN HUMILIATED OR EMOTIONALLY ABUSED IN OTHER WAYS BY YOUR PARTNER OR EX-PARTNER?: NO

## 2025-07-26 SDOH — SOCIAL STABILITY: SOCIAL INSECURITY: WITHIN THE LAST YEAR, HAVE YOU BEEN AFRAID OF YOUR PARTNER OR EX-PARTNER?: NO

## 2025-07-26 SDOH — SOCIAL STABILITY: SOCIAL INSECURITY
WITHIN THE LAST YEAR, HAVE YOU BEEN RAPED OR FORCED TO HAVE ANY KIND OF SEXUAL ACTIVITY BY YOUR PARTNER OR EX-PARTNER?: NO

## 2025-07-26 SDOH — ECONOMIC STABILITY: INCOME INSECURITY: IN THE PAST 12 MONTHS HAS THE ELECTRIC, GAS, OIL, OR WATER COMPANY THREATENED TO SHUT OFF SERVICES IN YOUR HOME?: NO

## 2025-07-26 ASSESSMENT — ACTIVITIES OF DAILY LIVING (ADL)
FEEDING YOURSELF: INDEPENDENT
HEARING - LEFT EAR: FUNCTIONAL
DRESSING YOURSELF: INDEPENDENT
GROOMING: INDEPENDENT
PATIENT'S MEMORY ADEQUATE TO SAFELY COMPLETE DAILY ACTIVITIES?: YES
WALKS IN HOME: INDEPENDENT
TOILETING: INDEPENDENT
BATHING: INDEPENDENT
HEARING - RIGHT EAR: FUNCTIONAL
ADEQUATE_TO_COMPLETE_ADL: YES
LACK_OF_TRANSPORTATION: NO
JUDGMENT_ADEQUATE_SAFELY_COMPLETE_DAILY_ACTIVITIES: YES
LACK_OF_TRANSPORTATION: NO

## 2025-07-26 ASSESSMENT — COGNITIVE AND FUNCTIONAL STATUS - GENERAL
MOBILITY SCORE: 24
PATIENT BASELINE BEDBOUND: NO
DAILY ACTIVITIY SCORE: 24
DAILY ACTIVITIY SCORE: 24
MOBILITY SCORE: 24

## 2025-07-26 ASSESSMENT — LIFESTYLE VARIABLES
EVER HAD A DRINK FIRST THING IN THE MORNING TO STEADY YOUR NERVES TO GET RID OF A HANGOVER: NO
TOTAL SCORE: 0
HAVE YOU EVER FELT YOU SHOULD CUT DOWN ON YOUR DRINKING: NO
HAVE PEOPLE ANNOYED YOU BY CRITICIZING YOUR DRINKING: NO
EVER FELT BAD OR GUILTY ABOUT YOUR DRINKING: NO

## 2025-07-26 ASSESSMENT — ENCOUNTER SYMPTOMS
HEADACHES: 0
FACIAL ASYMMETRY: 0
WEAKNESS: 0
NUMBNESS: 0
SPEECH DIFFICULTY: 0
TREMORS: 0

## 2025-07-26 ASSESSMENT — PAIN SCALES - GENERAL
PAINLEVEL_OUTOF10: 0 - NO PAIN

## 2025-07-26 ASSESSMENT — PAIN - FUNCTIONAL ASSESSMENT
PAIN_FUNCTIONAL_ASSESSMENT: 0-10

## 2025-07-26 ASSESSMENT — PAIN DESCRIPTION - PROGRESSION: CLINICAL_PROGRESSION: NOT CHANGED

## 2025-07-26 NOTE — Clinical Note
Charley Saraiva was seen and treated in our emergency department on 7/26/2025.  She may return to work on 07/28/2025.  Please excuse Charley Saravia from work 7/26/25-7/27/25. Ok to go back to work 7/28/25     If you have any questions or concerns, please don't hesitate to call.      Evans Bowen MD MPH

## 2025-07-26 NOTE — CONSULTS
Inpatient consult to Neurology  Consult performed by: Gretchen Leslie MD  Consult ordered by: Asif Meier MD          History Of Present Illness  Charley Saravia is a 30 y.o. female hx of headaches, anemia possibly 2/2 menorrhagia, prior hx of AUD presenting with vision loss. Vision loss started 7/4, vision went completely out in R eye and had some flashing lights. Went out for entire day and no episodes until recently when it went out a couple days ago on the 23rd. R eye went black again. Kept going in and out the whole day. Could not identify any trigger. Sometimes would go black and see flashes but sometimes has flashes that occur with regular vision. Happened again on 7/25 at which point she presented to doctor. Right now eyes are ok and vision came back this morning. Does endorse tinnitus in both ears that is chronic. Denies any pain any her eyes during episodes of vision loss. No weakness during vision loss.    Has had episodes of lightheadedness that happen randomly and gets presyncopal, some vertigo with these episodes.. Has to sit down to get better. Denies hx of urinary or bowel incontinence, no hx of legs getting numb or weak.    Denies any headache currently but not headaches usually with episodes. Did have some headaches on Friday. Headache 4/10. Headaches normally 6-7/10 bifrontal pulsating sensation. Not on any medications for 'migraine', they usually abort. Patient calls her headaches 'migraines'. Migraines have been going on for years but gets them roughly 1-2x a week. Never seen anyone for it. No visual changes when migraines come on. Denies photophobia, phonophobia, n/v. Sometimes last an hour and sometimes goes to sleep and wakes up and they are gone.    Hx of heavy menstrual bleeding, has a patch and states it helps a lot. States she bruises easily. Sometimes has epistaxis without trauma to the nose. Denies hematuria, hematochezia, melena. Has had episodes of hemoptysis during sinus infections.      Yesterday she went to the [facility] where her exam was notable for BCVA OD was 20/100 (not wearing contacts in OD) and DFE with OD optic disc edema and superior nerve thickening on RNFL testing. Pt decided to present to [facility] ED for ongoing sx, and admission testing notable for Hgb 5.9 after which she received 2 units of pRBCs. Of note, patient does endorse feeling lightheaded intermittently but denies positional lightheadedness/dizziness. When asked about recent alcohol use, states about 1 standard drink per week but prior heavier alcohol use ~10 years ago.     Past Medical History  Medical History[1]  Surgical History  Surgical History[2]  Social History  Social History[3]  Allergies  Patient has no known allergies.  Prescriptions Prior to Admission[4]    Review of Systems  Neurological Exam  Physical Exam    MENTAL STATUS:  - General appearance: No distress, alert, interactive and cooperative.    - Orientation: alert and oriented to person, place, time, and situation  - Language: Expression, repetition, naming, comprehension intact  - Follows complex commands across midline  - Thought processes: Logical, organized  - Judgment: Intact  - Insight: Intact    CRANIAL NERVES:  - II:  Visual fields intact to confrontation bilaterally tested individually and together  - III, IV, VI: PERRL, EOMI to pursuit without nystagmus  - V: V1-V3 sensation intact bilaterally  - VII: Face muscles symmetric with smile and eye closure  - VIII: Intact to voice  - IX, X: Palate elevated symmetrically bilaterally, no hoarseness  - XI: 5/5 strength on shoulder shrugging bilaterally  - XII: Tongue midline without atrophy or fasciculation    MOTOR:   Tone and bulk normal in all extremities  No fasciculations, tremor or other abnormal movements were present.     STRENGTH: R L  Deltoid  5 5  Biceps  5 5  Triceps  5 5    5 5  Thumb Abd 5 5  Finger Abd 5 5  Neck Flex 5          5  Neck Ext 5 5  Hip abduction 5 5  Hip  "adduction 5 5  Hip flexion 5 5  Quadriceps 5 5  Hamstrings 5 5  DorsiFlex 5          5  PlantarFlex 5 5    REFLEXES:  R  L  Biceps   1  1  Triceps   1  1  Brachioradialis  1  1  Patellar   1  1  Achilles 1  1  Plantar  Down Down    No Jin, crossed adductor, Babinski, or clonus    SENSORY:   Intact to light touchx4    COORDINATION:   Intact on finger to nose bl, intact on heel to shin bl, ABBY intact bl    GAIT:   Deferred    Last Recorded Vitals  Blood pressure 128/74, pulse 72, temperature 37.1 °C (98.8 °F), temperature source Oral, resp. rate 18, height 1.753 m (5' 9\"), weight 70.3 kg (155 lb), SpO2 97%.    Relevant Results  Scheduled medications  Scheduled Medications[5]  Continuous medications  Continuous Medications[6]  PRN medications  PRN Medications[7]           Margie Coma Scale  Best Eye Response: Spontaneous  Best Verbal Response: Oriented  Best Motor Response: Follows commands  [place] Coma Scale Score: 15                 I have personally reviewed the following imaging results:   Imaging  No results found.    Cardiology, Vascular, and Other Imaging  No other imaging results found for the past 7 days       Assessment/Plan   Assessment & Plan      Charley Saravia is a 30 y.o. female hx of headaches, anemia possibly 2/2 menorrhagia, prior hx of AUD presenting with multiple episodes of rapid onset R eye vision loss with associated flashes without clear triggers or other associated weakness and symptoms. Patient has history of headaches but no visual symptoms associated with headaches. Patient noted to have history of easy bruising, epistaxis, hemoptysis associated with sinusitis and on ED visit in [facility], Hb was noted to be 5.9. Patient states she has a hx of menorrhagia and is currently menstruating. Current exam is back to baseline with no focal findings. No prior hx of MS associated symptoms.    Differential is broad and includes vascular causes, migraines, optic neuritis. Need to rule out any vascular " causes. Young female patients are also at high risk for MS although this appears to be atypical presentation for optic neuritis.    Recommendations  - Appropriate for CDU admission  - MRI brain and orbit w/wo contrast to rule out hx of stroke and MS/optic neuritis  - MRA head and neck to rule out carotid stenosis, can defer carotid US for now  - TTE to complete stroke workup  - Stroke labs (A1c and lipid panel)    Case discussed with Dr. Cardona, will staff formally in AM.    Gretchen Leslie MD  Neurology PGY2     Senior Addendum:    In brief, Charley Saravia is a 29yo female with h/o headaches, anemia, presenting from OSH with multiple episodes of transient R eye monocular vision loss with associated flashes. No associated headache with patient's symptoms. Was in [place] with Hgb 5.9, has h/o menorrhagia. Multiple transient R sided vision loss occurring since beginning of 7/2025. Now back to baseline in the ED. Exam was nonfocal, no appreciated papilledema, no red desaturation, no pain with EOM, no RAPD, overall unremarkable neurologic exam. Given undifferentiated case, pt a young female places her at risk for demyelinating process such as multiple sclerosis. Although headache not associated with symptoms, suspicion remains for retinal migraine. Low suspicion for vascular etiology given lack of risk factors and age, however with assess for ischemia and vessel pathology on imaging. Pt will be admitted to CDU for further workup listed above.    Karsten Chavez,   PGY-3 Neurology    ----------------------------------------------------------------------------------------------------------------------------------------  ATTENDING ATTESTATION    I saw and evaluated the patient. I personally obtained the key and critical portions of the history and physical exam or was physically present for key and critical portions performed by the resident/fellow. I reviewed the resident/fellow's documentation and discussed the patient with the  resident/fellow. I agree with the resident/fellow's medical decision making as documented in the note with the exception/addition of the following:     Had two days, first day of right eye vision loss, then a few days on and off. Major finding on work-up thus far has been severe anemia in the setting of menorrhagia, Hb at ED was 5.9, given packed red blood cells, patient discussed with me and we decided to do MRI Brain and orbit and MRA Head and Neck study which were unrevealing.    Discussed with patient we do not have a definitive diagnosis for the transient eye vision loss. Patient was discussed with stroke attending and they also thought low suspicion for a TIA.    Plan  -- follow-up with PCP/ObGyn regarding anemia, needs Hb rechecked, there is some literature on anemia and vision loss  -- asked that she do a headache diary for us and discussed what she should write down as migraine with aura is in the differential  -- follow-up in outpatient neurology clinic    Abiola Cardona MD  General Neurology Attending  [facility]       [1]   Past Medical History:  Diagnosis Date    ASCUS with positive high risk HPV cervical 05/21/2024    Atypical squamous cells of undetermined significance on cytologic smear of cervix (ASC-US)     ASCUS on Pap smear   [2] History reviewed. No pertinent surgical history.  [3]   Social History  Tobacco Use    Smoking status: Never    Smokeless tobacco: Never   Substance Use Topics    Alcohol use: Yes     Alcohol/week: 0.0 - 1.0 standard drinks of alcohol    Drug use: Not Currently     Types: Marijuana   [4] (Not in a hospital admission)  [5] [6] [7]

## 2025-07-26 NOTE — DISCHARGE SUMMARY
"Discharge Diagnosis  Anemia   Right eye visual disturbance         Issues Requiring Follow-Up  Transfer to [facility] for ophthalmology evaluation     Discharge Meds     Medication List      ASK your doctor about these medications     loratadine 10 mg tablet; Commonly known as: Claritin; Take 1 tablet (10   mg) by mouth once daily.   Xulane 150-35 mcg/24 hr; Generic drug: norelgestromin-ethin.estradioL;   Place 1 patch on the skin 1 (one) time per week. apply as directed       Test Results Pending At Discharge  Pending Labs       Order Current Status    Folate In process    Occult Blood, Stool In process            Hospital Course     Charley Saravia is a 30 y.o. female with PMH of depression, headache, menorrhagia, presenting with abnormal vision. Patient had right eye vision loss \"went black\" on July 4th.  This continued throughout the night but when she woke up the next morning, her vision was normal.  She was doing fine until 2 days ago when her right eye vision \"went black\" again.  This lasted for short period of time, then she regains vision.  This is happened intermittently over the last two days.  She was seen by an oupatient ophthalmologist at [facility] in [place].  Per ED notes, there was concern for amaurosis fugax.  The patient says her ophthalmologist referred her to the ED for MRI and further evaluation.  Her vision is currently normal at the time of my examination.  She denies floaters, blurry vision, diplopia.  Denies any other focal neurological deficit such as weakness, speech changes, facial droop.   In ED, her lab work was remarkable for acute anemia with hemoglobin of 5.9.  The patient reports heavy bleeding during her menstrual cycles.  She bleeds through heavy pads quickly and passes large clots.  Sometimes she has to  the shower because she will saturate a pad too quickly.  Her last period was at the end of June.  She does note that the bleeding has been somewhat better since being " restarted on her birthcontrol patch in May.  She has been told to take iron in the past but stopped taking due to issues with constipation.  She endorses occasional dizziness upon standing, feels tired but not unusual for her.      Right eye visual disturbance   Concern for amaurosis fugax   - Discussed with transfer center ophthalmology, Dr. Richardson, this morning. Patient warrants ophthalmology evaluation and therefore requires transfer to [facility].  (No ophthalmology coverage at [facility]).  The patient will be transferred from [facility] to [facility] ED in order to expedite ophthalmology examination   - MRI brain, MRA head, MRI orbit and MR venography are pending  - consider discontinuing birth control patch due to potential for clotting, pending MRI/ophthalmology evaluation      Acute anemia, microcytic/hypochromic   Dysmenorrhea   - suspect secondary to menorrhagia   - s/p 2 units PRBCs overnight   - hemoglobin increased to 8.2 this morning   - check iron panel, B12, folate   - likely needs to restart iron tablets   - GYN consult in house versus close outpatient follow up--> consider discontinuing birth control patch as above.  IUD could be consideration     Disposition:   Accepted for transfer to [facility] ED for ophthalmology evaluation.  Pending .                  Pertinent Physical Exam At Time of Discharge  Physical Exam    Constitutional: NAD, pt alert and cooperative  Eyes: no icterus.  Reactive to light.  No nystagmus.  Patient with left contact in, usually wears contact in right, but had removed due to vision issues.  Bedside Snellen right eye 20/100 (no contacts), left eye 20/40.  ENMT: mucous membranes moist, no lesions seen  Head/Neck: Neck supple  Respiratory/Thorax: CTA bilaterally, non-labored breathing, no cough, on RA  Cardiovascular: Regular rate and rhythm, no murmurs heard  Gastrointestinal: Nondistended, soft, non-tender, BS present x 4  : no Vernon, no SP discomfort  Musculoskeletal: ROM intact,  no joint swelling  Extremities: normal extremities, no edema  Neurological: A&O x 3, speech clear, follows commands appropriately, cr. n. grossly intact, sensation grossly intact, motor 5/5 throughout  Skin: Warm and dry, no lesions, no rashes  Psych: calm, stable mood      Outpatient Follow-Up  No future appointments.      Miley Donovan, APRN-CNP

## 2025-07-26 NOTE — NURSING NOTE
Called report to [facility] Report given to [facility], pt leaving with transport company to transfer to [facility].

## 2025-07-26 NOTE — H&P
"History Of Present Illness  Charley Saravia is a 30 y.o. female with PMH of depression, headache, menorrhagia, presenting with abnormal vision. Patient had right eye vision loss \"went black\" on July 4th.  This continued throughout the night but when she woke up the next morning, her vision was normal.  She was doing fine until 2 days ago when her right eye vision \"went black\" again.  This lasted for short period of time, then she regains vision.  This is happened intermittently over the last two days.  She was seen by an oupatient ophthalmologist at [facility] in [place].  Per ED notes, there was concern for amaurosis fugax.  The patient says her ophthalmologist referred her to the ED for MRI and further evaluation.  Her vision is currently normal at the time of my examination.  She denies floaters, blurry vision, diplopia.  Denies any other focal neurological deficit such as weakness, speech changes, facial droop.   In ED, her lab work was remarkable for acute anemia with hemoglobin of 5.9.  The patient reports heavy bleeding during her menstrual cycles.  She bleeds through heavy pads quickly and passes large clots.  Sometimes she has to  the shower because she will saturate a pad too quickly.  Her last period was at the end of June.  She does note that the bleeding has been somewhat better since being restarted on her birthcontrol patch in May.  She has been told to take iron in the past but stopped taking due to issues with constipation.  She endorses occasional dizziness upon standing, feels tired but not unusual for her.      Past Medical History  Medical History[1]    Surgical History  Surgical History[2]     Social History  She reports that she has never smoked. She has never used smokeless tobacco. She reports current alcohol use. She reports that she does not currently use drugs after having used the following drugs: Marijuana.    Family History  Family History[3]     Allergies  Patient has " "no known allergies.    Review of Systems   Eyes:  Positive for visual disturbance.   Genitourinary:  Positive for menstrual problem.   Neurological:  Negative for tremors, facial asymmetry, speech difficulty, weakness, numbness and headaches.        Physical Exam    Constitutional: NAD, pt alert and cooperative  Eyes: no icterus.  Reactive to light.  No nystagmus.  Patient with left contact in, usually wears contact in right, but had removed due to vision issues.  Bedside Snellen right eye 20/100 (no contacts), left eye 20/40.  ENMT: mucous membranes moist, no lesions seen  Head/Neck: Neck supple  Respiratory/Thorax: CTA bilaterally, non-labored breathing, no cough, on RA  Cardiovascular: Regular rate and rhythm, no murmurs heard  Gastrointestinal: Nondistended, soft, non-tender, BS present x 4  : no Vernon, no SP discomfort  Musculoskeletal: ROM intact, no joint swelling  Extremities: normal extremities, no edema  Neurological: A&O x 3, speech clear, follows commands appropriately, cr. n. grossly intact, sensation grossly intact, motor 5/5 throughout  Skin: Warm and dry, no lesions, no rashes  Psych: calm, stable mood       Last Recorded Vitals  Blood pressure 117/72, pulse 80, temperature 36.5 °C (97.7 °F), temperature source Temporal, resp. rate 17, height 1.753 m (5' 9\"), weight 70.3 kg (155 lb), SpO2 100%.    Relevant Results      Scheduled medications  Scheduled Medications[4]  Continuous medications  Continuous Medications[5]  PRN medications  PRN Medications[6]     Results for orders placed or performed during the hospital encounter of 07/25/25 (from the past 24 hours)   CBC and Auto Differential   Result Value Ref Range    WBC 8.3 4.4 - 11.3 x10*3/uL    nRBC 0.2 (H) 0.0 - 0.0 /100 WBCs    RBC 3.31 (L) 4.00 - 5.20 x10*6/uL    Hemoglobin 5.9 (LL) 12.0 - 16.0 g/dL    Hematocrit 22.0 (L) 36.0 - 46.0 %    MCV 67 (L) 80 - 100 fL    MCH 17.8 (L) 26.0 - 34.0 pg    MCHC 26.8 (L) 32.0 - 36.0 g/dL    RDW 25.4 (H) " 11.5 - 14.5 %    Platelets 234 150 - 450 x10*3/uL    Immature Granulocytes %, Automated 0.2 0.0 - 0.9 %    Immature Granulocytes Absolute, Automated 0.02 0.00 - 0.70 x10*3/uL   Basic metabolic panel   Result Value Ref Range    Glucose 87 74 - 99 mg/dL    Sodium 139 136 - 145 mmol/L    Potassium 3.9 3.5 - 5.3 mmol/L    Chloride 110 (H) 98 - 107 mmol/L    Bicarbonate 23 21 - 32 mmol/L    Anion Gap 10 10 - 20 mmol/L    Urea Nitrogen 7 6 - 23 mg/dL    Creatinine 0.76 0.50 - 1.05 mg/dL    eGFR >90 >60 mL/min/1.73m*2    Calcium 8.8 8.6 - 10.3 mg/dL   Human Chorionic Gonadotropin, Serum Quantitative   Result Value Ref Range    HCG, Beta-Quantitative <2 <5 mIU/mL   Manual Differential   Result Value Ref Range    Neutrophils %, Manual 66.0 40.0 - 80.0 %    Lymphocytes %, Manual 32.0 13.0 - 44.0 %    Monocytes %, Manual 0.0 2.0 - 10.0 %    Eosinophils %, Manual 2.0 0.0 - 6.0 %    Basophils %, Manual 0.0 0.0 - 2.0 %    Seg Neutrophils Absolute, Manual 5.48 1.20 - 7.00 x10*3/uL    Lymphocytes Absolute, Manual 2.66 1.20 - 4.80 x10*3/uL    Monocytes Absolute, Manual 0.00 (L) 0.10 - 1.00 x10*3/uL    Eosinophils Absolute, Manual 0.17 0.00 - 0.70 x10*3/uL    Basophils Absolute, Manual 0.00 0.00 - 0.10 x10*3/uL    Total Cells Counted 100     RBC Morphology See Below     Polychromasia Mild     Hypochromia Mild     RBC Fragments Few    Type and Screen   Result Value Ref Range    ABO TYPE AB     Rh TYPE POS     ANTIBODY SCREEN NEG    Prepare RBC: 2 Units   Result Value Ref Range    PRODUCT CODE M3404X36     Unit Number [id number]     Unit ABO A     Unit RH POS     XM INTEP COMP     Dispense Status TR     Blood Expiration Date 8/25/2025 11:59:00 PM EDT     PRODUCT BLOOD TYPE 6200     UNIT VOLUME 350     PRODUCT CODE D7926R09     Unit Number [id number]     Unit ABO A     Unit RH POS     XM INTEP COMP     Dispense Status TR     Blood Expiration Date 8/19/2025 11:59:00 PM EDT     PRODUCT BLOOD TYPE 6200     UNIT VOLUME 350   "  VERIFY ABO/Rh Group Test   Result Value Ref Range    ABO TYPE AB     Rh TYPE POS    CBC   Result Value Ref Range    WBC 10.2 4.4 - 11.3 x10*3/uL    nRBC 0.0 0.0 - 0.0 /100 WBCs    RBC 3.88 (L) 4.00 - 5.20 x10*6/uL    Hemoglobin 8.2 (L) 12.0 - 16.0 g/dL    Hematocrit 28.6 (L) 36.0 - 46.0 %    MCV 74 (L) 80 - 100 fL    MCH 21.1 (L) 26.0 - 34.0 pg    MCHC 28.7 (L) 32.0 - 36.0 g/dL    RDW 27.6 (H) 11.5 - 14.5 %    Platelets 229 150 - 450 x10*3/uL   Basic metabolic panel   Result Value Ref Range    Glucose 92 74 - 99 mg/dL    Sodium 139 136 - 145 mmol/L    Potassium 4.2 3.5 - 5.3 mmol/L    Chloride 110 (H) 98 - 107 mmol/L    Bicarbonate 21 21 - 32 mmol/L    Anion Gap 12 10 - 20 mmol/L    Urea Nitrogen 9 6 - 23 mg/dL    Creatinine 0.92 0.50 - 1.05 mg/dL    eGFR 86 >60 mL/min/1.73m*2    Calcium 8.4 (L) 8.6 - 10.3 mg/dL   Iron and TIBC   Result Value Ref Range    Iron 19 (L) 35 - 150 ug/dL    UIBC >450 (H) 110 - 370 ug/dL    TIBC      % Saturation     Ferritin   Result Value Ref Range    Ferritin 9 8 - 150 ng/mL       No results found.       Assessment & Plan    Charley Saravia is a 30 y.o. female with PMH of depression, headache, menorrhagia, presenting with abnormal vision. Patient had right eye vision loss \"went black\" on July 4th.  This continued throughout the night but when she woke up the next morning, her vision was normal.  She was doing fine until 2 days ago when her right eye vision \"went black\" again.  This lasted for short period of time, then she regains vision.  This is happened intermittently over the last two days.  She was seen by an oupatient ophthalmologist at [facility] in [place].  Per ED notes, there was concern for amaurosis fugax.  The patient says her ophthalmologist referred her to the ED for MRI and further evaluation.  Her vision is currently normal at the time of my examination.  She denies floaters, blurry vision, diplopia.  Denies any other focal neurological deficit such as " weakness, speech changes, facial droop.   In ED, her lab work was remarkable for acute anemia with hemoglobin of 5.9.  The patient reports heavy bleeding during her menstrual cycles.  She bleeds through heavy pads quickly and passes large clots.  Sometimes she has to  the shower because she will saturate a pad too quickly.  Her last period was at the end of June.  She does note that the bleeding has been somewhat better since being restarted on her birthcontrol patch in May.  She has been told to take iron in the past but stopped taking due to issues with constipation.  She endorses occasional dizziness upon standing, feels tired but not unusual for her.     Right eye visual disturbance   Concern for amaurosis fugax   - Discussed with transfer center ophthalmology, Dr. Richardson, this morning. Patient warrants ophthalmology evaluation and therefore requires transfer to [facility].  (No ophthalmology coverage at [facility]).  The patient will be transferred from [facility] to [facility] ED in order to expedite ophthalmology examination   - MRI brain, MRA head, MRI orbit and MR venography are pending  - consider discontinuing birth control patch due to potential for clotting, pending MRI/ophthalmology evaluation     Acute anemia, microcytic/hypochromic   Dysmenorrhea   - suspect secondary to menorrhagia   - s/p 2 units PRBCs overnight   - hemoglobin increased to 8.2 this morning   - check iron panel, B12, folate   - likely needs to restart iron tablets   - GYN consult in house versus close outpatient follow up--> consider discontinuing birth control patch as above.  IUD could be consideration    Depression   - stable     Headaches   - stable, none currently     VTE/GI prophylaxis   - ambulate   - PPI, miralax as needed     Disposition:   Accepted for transfer to [facility] ED for ophthalmology evaluation.  Pending .       I spent 60 minutes in the professional and overall care of this patient.      Miley Donovan,  APRN-CNP         [1]   Past Medical History:  Diagnosis Date    ASCUS with positive high risk HPV cervical 05/21/2024    Atypical squamous cells of undetermined significance on cytologic smear of cervix (ASC-US)     ASCUS on Pap smear   [2] No past surgical history on file.  [3]   Family History  Problem Relation Name Age of Onset    No Known Problems Mother      No Known Problems Father     [4] pantoprazole, 40 mg, oral, Daily before breakfast   Or  pantoprazole, 40 mg, intravenous, Daily before breakfast  [5] sodium chloride 0.9%, 100 mL/hr, Last Rate: 100 mL/hr (07/26/25 4510)  [6] PRN medications: acetaminophen **OR** acetaminophen **OR** acetaminophen, ondansetron **OR** ondansetron, polyethylene glycol

## 2025-07-26 NOTE — NURSING NOTE
Attempted to return call for report x2 at number provided by  ([phone]). No answer and line eventually disconnected so nurse could not leave voicemail.

## 2025-07-26 NOTE — ED PROVIDER NOTES
HPI   Chief Complaint   Patient presents with    Eye Problem       The patient presents here from her eye doctor visit today with concern for an abnormal blood vessel in her eye.  She was told that she needs an MRI MRA and MRI of the orbit.  She was found to be anemic in triage.  She states he did have heavy periods until May when she started taking the patch.  She has not had any blood procedures before.  She denies any blood in her stool or melena.  She denies any fever cough or vomiting.  No pain at this time.  Her vision is currently functional and on but he says it does present with graying out.  She said she feels lightheaded sometimes but is not associated with his vision.              Patient History   Medical History[1]  Surgical History[2]  Family History[3]  Social History[4]    Physical Exam   ED Triage Vitals   Temperature Heart Rate Respirations BP   07/25/25 1656 07/25/25 1656 07/25/25 1656 07/25/25 1656   36.6 °C (97.8 °F) 80 18 119/66      Pulse Ox Temp src Heart Rate Source Patient Position   07/25/25 1656 -- 07/25/25 2100 --   100 %  Monitor       BP Location FiO2 (%)     -- --             Physical Exam  Vitals and nursing note reviewed.   Constitutional:       General: She is not in acute distress.     Appearance: She is well-developed.   HENT:      Head: Normocephalic and atraumatic.      Nose: Nose normal.      Mouth/Throat:      Mouth: Mucous membranes are moist.     Eyes:      Conjunctiva/sclera: Conjunctivae normal.      Comments: Pale     Cardiovascular:      Rate and Rhythm: Regular rhythm.      Heart sounds: No murmur heard.  Pulmonary:      Effort: Pulmonary effort is normal. No respiratory distress.   Abdominal:      General: There is no distension.      Palpations: Abdomen is soft.      Tenderness: There is no right CVA tenderness or left CVA tenderness.     Musculoskeletal:         General: No swelling.      Cervical back: Neck supple.     Skin:     General: Skin is warm and dry.       Coloration: Skin is pale.     Neurological:      General: No focal deficit present.      Mental Status: She is alert and oriented to person, place, and time.     Psychiatric:         Mood and Affect: Mood normal.           ED Course & MDM                  No data recorded     [place] Coma Scale Score: 15 (07/25/25 1700 : Chencho Murillo, SHANNON)                           Medical Decision Making      Procedure  Procedures       [1]   Past Medical History:  Diagnosis Date    ASCUS with positive high risk HPV cervical 05/21/2024    Atypical squamous cells of undetermined significance on cytologic smear of cervix (ASC-US)     ASCUS on Pap smear   [2] No past surgical history on file.  [3]   Family History  Problem Relation Name Age of Onset    No Known Problems Mother      No Known Problems Father     [4]   Social History  Tobacco Use    Smoking status: Never    Smokeless tobacco: Never   Substance Use Topics    Alcohol use: Yes     Alcohol/week: 7.0 standard drinks of alcohol     Types: 7 Standard drinks or equivalent per week    Drug use: Not Currently     Types: Marijuana      review of laboratory studies, ordering and review of radiographic studies, pulse oximetry, re-evaluation of patient's condition, review of old charts, examination of patient, obtaining history from patient or surrogate and evaluation of patient's response to treatment    Care discussed with: admitting provider             Jose Jacobs MD  07/28/25 7349         [1]  Past Medical History:  Diagnosis Date   • ASCUS with positive high risk HPV cervical 05/21/2024   • Atypical squamous cells of undetermined significance on cytologic smear of cervix (ASC-US)     ASCUS on Pap smear   [2]  No past surgical history on file.  [3]  Family History  Problem Relation Name Age of Onset   • No Known Problems Mother     • No Known Problems Father     [4]  Social History  Tobacco Use   • Smoking status: Never   • Smokeless tobacco: Never   Substance Use Topics   • Alcohol use: Yes     Alcohol/week: 0.0 - 1.0 standard drinks of alcohol   • Drug use: Not Currently     Types: Marijuana      Jose Jacobs MD  07/28/25 1019

## 2025-07-26 NOTE — ED PROVIDER NOTES
History of Present Illness     History provided by: Patient  Limitations to History: None    HPI:  Charley Saravia is a 30 y.o. female transferred from [facility] ophthalmology evaluation.  This patient has a past medical history of hypertension, headache, menorrhagia presented today with abnormal vision.  Patient states that her right eye went black on July 4 when she was going to sleep but when she woke up her vision was normal, she was doing fine until 2 days ago when she experienced the same sensation of her eye going black.  She states there is no preceding symptoms.  She does endorse intermittent headaches on the right side behind her temple.  States that these resolved spontaneously without her needing to take medication patient was seen by an ophthalmologist at [facility] who stated there is concern for amaurosis fugax.  Of note when patient presented to [facility] initially she was also found to have hgb of 5.8 and transfused 2 u PRBCs, she was admitted to the observation unit there when she had another episode of right eye vision complete blackness, transferred downtown for ophthalmology evaluation afterwards.    Physical Exam   Triage vitals:  T 37.1 °C (98.8 °F)  HR 77  /72  RR 18  O2 100 % None (Room air)    General: Awake, alert, in no acute distress  Eyes: Pupils equal round reactive to light.  Extraocular movements intact.  HENT: Normo-cephalic, atraumatic. No stridor  CV: Regular rate, regular rhythm. Radial pulses 2+ bilaterally  Resp: Breathing non-labored, speaking in full sentences.  Clear to auscultation bilaterally  GI: Soft, non-distended, non-tender.   Extremities: No gross bony deformities. Moving all extremities  Skin: Warm. Appropriate color  Neuro: Awake and alert. Answers all questions appropriately. Speech is clear. Face is symmetric without facial droop and facial sensation to light touch equal bilaterally. Uvula midline. Tongue protrusion midline. Hearing intact bilaterally. Full  and equal shoulder shrug and head turn against resistance. 5/5 motor strength of UEs and LEs. Sensation to light touch intact in all four extremities. Finger to nose and heel to shin intact. No pronator drift. No gait abnormalities.    Psych: Appropriate mood and affect    Medical Decision Making & ED Course   Medical Decision Makin y.o. female presenting with need for evaluation by ophthalmology for right sided transient vision loss. Triage vital signs reviewed and patient is hemodynamically stable at this time.  Reviewed labs from who show where patient was admitted for observation due to decreased hemoglobin.  Patient had received 2 units prior to transfer and her hemoglobin incremented appropriately.  Patient has history of heavy periods, she is not currently having any bleeding.  Her last period was approximately 3 weeks ago.  Patient states that since she started birth control her bleeding has been significantly improved.  Iron studies were obtained with a decreased iron to 19 at [facility].  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  They are concerned for possible TIA with intermittent vision loss they do not recommend MRI at this time but did ask we consult neurology for rule out of vascular/stroke etiology based on her presentation.  They did recommend bilateral carotid ultrasound and stroke neurology.  Consult was placed to neurology.  Patient signed out to incoming provider pending neurology recommendations and disposition.    ED Course:         Independent Result Review and Interpretation: Relevant laboratory and radiographic results were reviewed and independently interpreted by myself.  As necessary, they are commented on in the ED Course.    Care Considerations: As documented above in MDM      Disposition   Patient was signed out to incoming provider at 7p pending completion of their  work-up.  Please see the next provider's transition of care note for the remainder of the patient's care.     Procedures   Procedures    Patient seen and discussed with ED attending physician.    Susan Verdin DO  Emergency Medicine     Susan Verdin DO  Resident  07/26/25 3142

## 2025-07-26 NOTE — CONSULTS
Reason For Consult  Episodes of right vision loss with flashes of light     History Of Present Illness  Charley Saravia is a 30 y.o. female with pmhx of headaches, anemia, alcohol use disorder, OCP use and no past ocular hx of daily CL use who presents as a transfer from [facility] ED for transient episodes of right vision loss with occasional flashes of light that co-occur with episodes. Episodes started on July 4th where her vision went completely dark, and per patient there are no precipitating or relieving factors and episodes last variable amounts of time up to 1 day. Also endorsing non-pulsatile tinnitus in both ears. Yesterday she went to the [facility] where her exam was notable for BCVA OD was 20/100 (not wearing contacts in OD) and DFE with OD optic disc edema and superior nerve thickening on RNFL testing. Pt decided to present to [facility] ED for ongoing sx, and admission testing notable for Hgb 5.9 after which she received 2 units of pRBCs. Of note, patient does endorse feeling lightheaded intermittently but denies positional lightheadedness/dizziness. When asked about recent alcohol use, states about 1 standard drink per week but prior heavier alcohol use ~10 years ago.     ROS:   (+) transient vision loss, mild R-sided periorbital pain with episodes, headaches   (-) eye pain, eye redness, neck pain, recent fevers/chills, diplopia, photophobia, curtaining, recent head/eye trauma     Past Medical History  She has a past medical history of ASCUS with positive high risk HPV cervical (05/21/2024) and Atypical squamous cells of undetermined significance on cytologic smear of cervix (ASC-US).    Surgical History  She has no past surgical history on file.    Social History  She reports that she has never smoked. She has never used smokeless tobacco. She reports current alcohol use. She reports that she does not currently use drugs after having used the following drugs: Marijuana.    Family History  Grandmother  "has SLE and RA     Allergies  Patient has no known allergies.       Physical Exam  Base Eye Exam       Visual Acuity (Snellen - Linear)         Right Left    Near sc 20/40 ph 20/20-2     Near cc  20/20              Tonometry (Tonopen, 1:49 PM)         Right Left    Pressure 14 14              Pupils         Dark Light Shape React APD    Right 5 3 Round Brisk None    Left 5 3 Round Brisk None              Visual Fields         Left Right     Full Full              Extraocular Movement         Right Left     Full, Ortho Full, Ortho   No internuclear ophthalmoplegia (NAHUM) with saccades             Dilation       Both eyes: 1% Tropic 2.5% Phen @ 1:39 PM                  Additional Tests       Color         Right Left    Ishihara 11/11 11/11                  Slit Lamp and Fundus Exam       External Exam         Right Left    External Normal Normal              Slit Lamp Exam         Right Left    Lids/Lashes Normal Normal    Conjunctiva/Sclera White and quiet White and quiet    Cornea Clear Clear    Anterior Chamber Deep and quiet Deep and quiet    Iris Round and reactive Round and reactive    Lens Clear Clear    Anterior Vitreous Normal Normal              Fundus Exam         Right Left    Disc tr nasal elevation/nasal tilt, sharp margins tr nasal elevation/nasal tilt, sharp margins    C/D Ratio 0.15 0.1    Macula Normal Normal    Vessels Normal Normal    Periphery One superior intraretinal hemorrhage Normal                     Last Recorded Vitals  Blood pressure 116/72, pulse 77, temperature 37.1 °C (98.8 °F), temperature source Oral, resp. rate 18, height 1.753 m (5' 9\"), weight 70.3 kg (155 lb), SpO2 100%.    Relevant Results    07/26/2025 Hb LOW 5.9, folate wnl   09/18/2021 Hb LOW 11.9, syphilis non-reactive   06/28/2021 Hb LOW 9.9, B12 318, folate wnl   05/17/2021 Hb LOW 10.8   02/08/2021 Hb 12.8, syphilis non-reactive   07/17/2019 Hb LOW 10.9, TSH 0.87     07/25/2025 OCT RNFL  & OS 83. ([facility] " note)      Assessment/Plan     # Transient vision loss, right eye  # Tilted optic disc, both eyes  # Concern for vision loss secondary to severe anemia  - 30F with history of anemia, OCP use, AUD, headaches, CL use, BMI 23 transferred from [facility] for transient right eye vision loss. Episodes started in a few weeks ago. They last anywhere from a few minutes to the whole day with mild right periorbital pain. No precipitating factors including positional changes. Sometimes will have flashes of light with vision loss.   - entrance exam does not show decreased vision, RAPD, color or motility abnormalities  - ddx is broad and includes TIA, concern for elevated intracranial pressure (ICP), optic neuritis, transient visual acuity (VA) loss due to severe anemia, and visual aura without migraine. Pertinent negatives include no decreased color vision, no APD, no eye pain, no systemic sx, no pulsatile tinnitus, which make IIH and optic neuritis less likely. Need to rule out vascular/stroke etiology based on her presentation    Recommendations  - bilateral carotid ultrasound and consult stroke neurology for right transient vision loss  - recommend anemia workup per ED or medicine  - defer MRI at this time      Bonny Diaz MD  Ophthalmology PGY-2    Ophthalmology Adult Pager - [phone]  Ophthalmology Pediatrics Pager - [phone]     For adult follow-up appointments, call: [phone]  For pediatric follow-up appointments, call: [phone]     Pt seen/examined/discussed with Dr. Perdomo (PGY-3). Pt discussed with Dr. Ankush Curiel (neuro-ophthalmology). Note finalized upon attending signature.

## 2025-07-26 NOTE — PROGRESS NOTES
Emergency Department Transition of Care Note       Signout   I received Charley Saravia in signout from Dr. Verdin.  Please see the ED Provider Note for all HPI, PE and MDM up to the time of signout at 1900.  This is in addition to the primary record.    In brief Charley Saravia is an 30 y.o. female presenting as a transfer from [facility] for ophthalmology consult, as she has been having episodes of transient vision loss in the right eye after starting birth control for heavy periods.  Ophthalmology evaluated the patient.  They noted tilted optic disks in both eyes, but were most concerned for vision loss secondary to severe anemia.  Vascular/stroke etiology should be ruled out, and neurology has also been consulted.  Ophthalmology did suggest bilateral carotid ultrasound, however imaging has been deferred until final neurology recommendations are received.    At the time of signout we were awaiting:  Neurology recommendations    ED Course & Medical Decision Making   Medical Decision Making:  See ED course    ED Course:  ED Course as of 07/26/25 2047   Sat Jul 26, 2025 1918 Signout received from Dr. Verdin.  In brief, this is a 30-year-old female who presents as a transfer from [facility] for ophthalmology consult.  She has a history of heavy menstrual periods and anemia.  She was found to have a hemoglobin of 5.9 and received a blood transfusion.  She was recently started on birth control to help her heavy bleeding, but has since had episodes of vision loss.  Ophthalmology has seen her and do not have any urgent recommendations.  Neurology has been consulted as well, and we are awaiting final recommendations.  Plan for likely CDU admission for continued workup per neurology. [MG]   1954 Neurology recommending CDU for MR brain and orbits w/wo, MRA head and neck, TTE, A1c and lipid panel. Will defer carotid US at this time. [MG]   2046 Patient was discussed with CDU FARHAD, including recommended neurology workup, and she was excepted  for admission for observation.  The patient was updated and in agreement with this plan of care. [MG]      ED Course User Index  [MG] Alyssa Webb MD         Diagnoses as of 07/26/25 2047   Transient visual loss of right eye   Iron deficiency anemia due to chronic blood loss       Disposition   As a result of their workup, the patient will require admission to the hospital.  The patient was informed of her diagnosis.  The patient was given the opportunity to ask questions and I answered them. The patient agreed to be admitted to the hospital.    Patient seen and discussed with ED attending physician.    Alyssa Webb MD  Emergency Medicine

## 2025-07-26 NOTE — ED TRIAGE NOTES
Pt transferred from [facility] for optho consult and MRI. Vision is clear at this point. Received two units of blood at [facility] for H&H of 5.9

## 2025-07-26 NOTE — PROGRESS NOTES
07/26/25 0939   Discharge Planning   Living Arrangements Alone   Support Systems Family members   Type of Residence Private residence   Home or Post Acute Services None   Expected Discharge Disposition Home   Financial Resource Strain   How hard is it for you to pay for the very basics like food, housing, medical care, and heating? Not hard   Housing Stability   In the last 12 months, was there a time when you were not able to pay the mortgage or rent on time? N   In the past 12 months, how many times have you moved where you were living? 0   At any time in the past 12 months, were you homeless or living in a shelter (including now)? N   Transportation Needs   In the past 12 months, has lack of transportation kept you from medical appointments or from getting medications? no   In the past 12 months, has lack of transportation kept you from meetings, work, or from getting things needed for daily living? No   Intensity of Service   Intensity of Service 0-30 min     Pt here with anemia. [facility] is 24. Anticipate HNN.

## 2025-07-26 NOTE — CARE PLAN
The patient's goals for the shift include     Problem: Pain - Adult  Goal: Verbalizes/displays adequate comfort level or baseline comfort level  Outcome: Progressing     Problem: Safety - Adult  Goal: Free from fall injury  Outcome: Progressing     Problem: Discharge Planning  Goal: Discharge to home or other facility with appropriate resources  Outcome: Progressing       The clinical goals for the shift include     Problem: Chronic Conditions and Co-morbidities  Goal: Patient's chronic conditions and co-morbidity symptoms are monitored and maintained or improved  Outcome: Progressing     Problem: Nutrition  Goal: Nutrient intake appropriate for maintaining nutritional needs  Outcome: Progressing

## 2025-07-27 VITALS
BODY MASS INDEX: 22.96 KG/M2 | SYSTOLIC BLOOD PRESSURE: 118 MMHG | OXYGEN SATURATION: 100 % | DIASTOLIC BLOOD PRESSURE: 79 MMHG | HEIGHT: 69 IN | HEART RATE: 75 BPM | WEIGHT: 155 LBS | TEMPERATURE: 98.8 F | RESPIRATION RATE: 18 BRPM

## 2025-07-27 LAB
ALBUMIN SERPL BCP-MCNC: 3.5 G/DL (ref 3.4–5)
ALP SERPL-CCNC: 44 U/L (ref 33–110)
ALT SERPL W P-5'-P-CCNC: 14 U/L (ref 7–45)
ANION GAP SERPL CALC-SCNC: 11 MMOL/L (ref 10–20)
AST SERPL W P-5'-P-CCNC: 15 U/L (ref 9–39)
BASOPHILS # BLD AUTO: 0.05 X10*3/UL (ref 0–0.1)
BASOPHILS NFR BLD AUTO: 0.5 %
BILIRUB SERPL-MCNC: 0.5 MG/DL (ref 0–1.2)
BUN SERPL-MCNC: 7 MG/DL (ref 6–23)
CALCIUM SERPL-MCNC: 8.3 MG/DL (ref 8.6–10.6)
CHLORIDE SERPL-SCNC: 109 MMOL/L (ref 98–107)
CO2 SERPL-SCNC: 23 MMOL/L (ref 21–32)
CREAT SERPL-MCNC: 0.82 MG/DL (ref 0.5–1.05)
EGFRCR SERPLBLD CKD-EPI 2021: >90 ML/MIN/1.73M*2
EOSINOPHIL # BLD AUTO: 0.18 X10*3/UL (ref 0–0.7)
EOSINOPHIL NFR BLD AUTO: 1.9 %
ERYTHROCYTE [DISTWIDTH] IN BLOOD BY AUTOMATED COUNT: 27.6 % (ref 11.5–14.5)
EST. AVERAGE GLUCOSE BLD GHB EST-MCNC: 100 MG/DL
EST. AVERAGE GLUCOSE BLD GHB EST-MCNC: 94 MG/DL
GLUCOSE SERPL-MCNC: 84 MG/DL (ref 74–99)
HBA1C MFR BLD: 4.9 % (ref ?–5.7)
HBA1C MFR BLD: 5.1 % (ref ?–5.7)
HCT VFR BLD AUTO: 27.5 % (ref 36–46)
HGB BLD-MCNC: 8 G/DL (ref 12–16)
IMM GRANULOCYTES # BLD AUTO: 0.04 X10*3/UL (ref 0–0.7)
IMM GRANULOCYTES NFR BLD AUTO: 0.4 % (ref 0–0.9)
LYMPHOCYTES # BLD AUTO: 2.33 X10*3/UL (ref 1.2–4.8)
LYMPHOCYTES NFR BLD AUTO: 25.1 %
MCH RBC QN AUTO: 20.5 PG (ref 26–34)
MCHC RBC AUTO-ENTMCNC: 29.1 G/DL (ref 32–36)
MCV RBC AUTO: 71 FL (ref 80–100)
MONOCYTES # BLD AUTO: 0.55 X10*3/UL (ref 0.1–1)
MONOCYTES NFR BLD AUTO: 5.9 %
NEUTROPHILS # BLD AUTO: 6.13 X10*3/UL (ref 1.2–7.7)
NEUTROPHILS NFR BLD AUTO: 66.2 %
NRBC BLD-RTO: 0 /100 WBCS (ref 0–0)
PLATELET # BLD AUTO: 263 X10*3/UL (ref 150–450)
POTASSIUM SERPL-SCNC: 3.6 MMOL/L (ref 3.5–5.3)
PROT SERPL-MCNC: 6.5 G/DL (ref 6.4–8.2)
RBC # BLD AUTO: 3.9 X10*6/UL (ref 4–5.2)
SODIUM SERPL-SCNC: 139 MMOL/L (ref 136–145)
WBC # BLD AUTO: 9.3 X10*3/UL (ref 4.4–11.3)

## 2025-07-27 PROCEDURE — 85025 COMPLETE CBC W/AUTO DIFF WBC: CPT | Performed by: NURSE PRACTITIONER

## 2025-07-27 PROCEDURE — 36415 COLL VENOUS BLD VENIPUNCTURE: CPT | Performed by: NURSE PRACTITIONER

## 2025-07-27 PROCEDURE — 99255 IP/OBS CONSLTJ NEW/EST HI 80: CPT | Performed by: PSYCHIATRY & NEUROLOGY

## 2025-07-27 PROCEDURE — 80053 COMPREHEN METABOLIC PANEL: CPT | Performed by: NURSE PRACTITIONER

## 2025-07-27 PROCEDURE — G0378 HOSPITAL OBSERVATION PER HR: HCPCS

## 2025-07-27 PROCEDURE — 83036 HEMOGLOBIN GLYCOSYLATED A1C: CPT | Performed by: NURSE PRACTITIONER

## 2025-07-27 RX ORDER — FERROUS SULFATE 325(65) MG
325 TABLET ORAL 2 TIMES DAILY
Qty: 60 TABLET | Refills: 0 | Status: SHIPPED | OUTPATIENT
Start: 2025-07-27 | End: 2025-08-26

## 2025-07-27 RX ORDER — DOCUSATE SODIUM 100 MG/1
100 CAPSULE, LIQUID FILLED ORAL 2 TIMES DAILY
Qty: 60 CAPSULE | Refills: 0 | Status: SHIPPED | OUTPATIENT
Start: 2025-07-27

## 2025-07-27 NOTE — H&P
History and Physical  [facility] CLINICAL DECISION  Patient: Charley Saravia  MRN: [id number]  : 1995  Date of Evaluation: 2025  ED Provider: Shaylee Perez PA-C      Limitations to history: none  Independent Historian: yes  External Records Reviewed: yes      Patient History:  Charley Saravia is a 30 y.o. female with a pmh of hypertension, anemia, and menorrhagia treated with hormone patch, who presents to the emergency department via transfer from [facility] complaining of transient vision loss in the R eye seeking ophthalmology consult. This intermittent loss of vision in the R eye began in July and pt started birth control in May.  Patient states that her right eye went black on  when she was going to sleep but when she woke up her vision was normal, she was doing fine until 2 days ago when she experienced the same sensation of her eye going black.  She states there are no preceding symptoms.  She does endorse intermittent headaches on the right side behind her temple.  States that these have resolved spontaneously without her needing to take medication. Patient was seen by an ophthalmologist at [facility] who stated there is concern for amaurosis fugax.  Of note when patient presented to [facility] initially she was also found to have hgb of 5.8 and transfused 2 u PRBCs, she was admitted to the observation unit there when she had another episode of right eye vision complete blackness, transferred to [facility] for ophthalmology evaluation afterwards. Ophthalmology at [facility] evaluated the patient.  They noted tilted optic disks in both eyes, but were most concerned for vision loss secondary to severe anemia.  Vascular/stroke etiology should be ruled out, and neurology has also been consulted.  Ophthalmology did suggest bilateral carotid ultrasound, however imaging has been deferred until final neurology recommendations are received. Neurology recommended MRI, MRA, TTE, lipid panel and A1c.   Neurology was informed that echo cannot be completed over the weekend in the CDU.    The acute evaluation in the ED included:  Consult to Ophthalmology and Neurology.       Orders Placed This Encounter   Procedures    MR brain w and wo IV contrast    MR orbit w and wo IV contrast    MR angio head wo IV contrast    MR angio neck wo IV contrast    Lipid Panel    Hemoglobin A1c    Adult diet Regular    Inpatient consult to Ophthalmology    Inpatient consult to neurology    Initiate Observation Send to CDU       I reviewed the below labs and imaging as ordered by the ED provider:  MR brain w and wo IV contrast         MR orbit w and wo IV contrast         MR angio neck wo IV contrast         MR angio head wo IV contrast             Labs Reviewed   LIPID PANEL       Result Value    Cholesterol 131      HDL-Cholesterol 51.9      Cholesterol/HDL Ratio 2.5      LDL Calculated 57      VLDL 22      Triglycerides 111      Non HDL Cholesterol 79     HEMOGLOBIN A1C           After discussion with the ED provider, a decision was made to admit the patient to the Clinical Decision Unit.    Upon admission to the Clinical Decision Unit, lipid panel, A1C, MRA and MRI were ordered and completed (pending results). Patient has been resting comfortably, vitals have been stable, and there have been no more episodes of vision loss or changes.     Past History   Medical History[1]  Surgical History[2]        Social History: does not smoke cigarettes.     Medications/Allergies     Previous Medications    LORATADINE (CLARITIN) 10 MG TABLET    Take 1 tablet (10 mg) by mouth once daily.    XULANE 150-35 MCG/24 HR    Place 1 patch on the skin 1 (one) time per week. apply as directed     Allergies[3]      Review of Systems  All systems reviewed and otherwise negative, except as stated above in HPI.      Physical Exam     Visit Vitals  /70 (BP Location: Left arm, Patient Position: Lying)   Pulse 78   Temp 36.3 °C (97.4 °F) (Temporal)   Resp  "16   Ht 1.753 m (5' 9\")   Wt 70.3 kg (155 lb)   SpO2 98%   BMI 22.89 kg/m²   OB Status Having periods   Smoking Status Never   BSA 1.85 m²       Physical Exam:    Appearance: Alert, oriented , cooperative,  in no acute distress. Well nourished & well hydrated.    Skin: Intact,  dry skin, no lesions, rash, petechiae or purpura.     Eyes: PERRLA, EOMs intact,  Conjunctiva pink with no redness or exudates. Cornea & anterior chamber are clear, Eyelids without lesions. No scleral icterus.     ENT: Hearing grossly intact. Nares patent, mucus membranes moist.  Normal phonation.    Neck: Supple, without meningismus. FROM.     Pulmonary: Good air exchange. Lungs clear bilaterally with good chest wall excursion. No rales, rhonchi or wheezing. No accessory muscle use or stridor.    Cardiac: Regular Rate and Rhythm.     Abdomen: Soft, nontender, active bowel sounds. No rebound or guarding.  No CVA tenderness.    Musculoskeletal: Full range of motion. no pain or deformity. Pulses full and equal. No cyanosis, clubbing, or edema.     Neurological:  Cranial nerves II through XII are grossly intact,  normal sensation, no weakness, no focal findings identified.     Psychiatric: Appropriate mood and affect.       Consultants  1) Neurology  2)Ophthalmology      Impression and Plan  In summary, Charley Saravia is admitted to the [facility] Clinical Decision Unit for vision loss. Dr. Adame is the CDU admission attending.    This patient has been risk-stratified based on available history, physical exam, and related study findings. Admission to the observation status for further diagnosis/treatment/monitoring of vision loss is warranted clinically. This extended period of observation is specifically required to determine the need for hospitalization.     Assessment and Plan:  1.Vision loss:  -Following Neurology and Ophthalmology Recs:  MRI, MRA, Lipid panel and A1C ordered. (Echo unable to be completed as it is " over the weekend. Neurology informed.)  -Monitoring  -Vitals      Goals: No additional episodes of vision loss, Neurology workup completed.Following final Neurology recs.      When met, appropriate disposition will be arranged  Shaylee Perez [facility]         [1]   Past Medical History:  Diagnosis Date    ASCUS with positive high risk HPV cervical 05/21/2024    Atypical squamous cells of undetermined significance on cytologic smear of cervix (ASC-US)     ASCUS on Pap smear   [2] History reviewed. No pertinent surgical history.  [3] No Known Allergies

## 2025-07-27 NOTE — DISCHARGE SUMMARY
Disposition Note  [facility] CLINICAL DECISION  Patient: Charley Saravia  MRN: [id number]  : 1995  Date of Evaluation: 2025  ED Provider: GAYATHRI Doll      Limitations to history: none  Independent Historian: patient  External Records Reviewed: outpatient records, inpatient notes, ed records      Subjective:    Charley Saravia is a 30 y.o. female has undergone comprehensive diagnostic evaluation and therapeutic management in accordance with the CDU guidelines for transient vision loss, anemia. Based on the patient's clinical response and diagnostic information during this period of observation, it has been determined that the patient will be discharged home.  Patient presented for transient vision loss to her right eye since 25. Vision is back to normal. Ophthalmology and Neurology team was consulted. Patient underwent MRI's early this morning. Neurology team was recommending further TIA work up and was advised that we were unable to obtain an echo today (since it is ) and reports they will arrange for an outpatient echo and follow up on the [facility] (outpatient). Patient was found to be acutely anemic at [facility] with a hemoglobin of 5.9. Patient received 2 units of blood and hemoglobin increased to 8.2.     The acute evaluation included:  Orders Placed This Encounter   Procedures    MR brain w and wo IV contrast    MR orbit w and wo IV contrast    MR angio head wo IV contrast    MR angio neck wo IV contrast    Lipid Panel    Hemoglobin A1c    Hemoglobin A1c    CBC and Auto Differential    Comprehensive metabolic panel    Referral to Primary Care    Referral to Neurology    Adult diet Regular    Inpatient consult to Ophthalmology    Inpatient consult to neurology    Holter or Event Cardiac Monitor    Initiate Observation Send to CDU       Results for orders placed or performed during the hospital encounter of 25   Lipid Panel    Collection Time: 25  8:24 AM    Result Value Ref Range    Cholesterol 131 0 - 199 mg/dL    HDL-Cholesterol 51.9 mg/dL    Cholesterol/HDL Ratio 2.5     LDL Calculated 57 <=99 mg/dL    VLDL 22 0 - 40 mg/dL    Triglycerides 111 0 - 149 mg/dL    Non HDL Cholesterol 79 0 - 149 mg/dL   Hemoglobin A1c    Collection Time: 07/27/25  7:47 AM   Result Value Ref Range    Hemoglobin A1C 5.1 See comment %    Estimated Average Glucose 100 Not Established mg/dL   CBC and Auto Differential    Collection Time: 07/27/25  7:47 AM   Result Value Ref Range    WBC 9.3 4.4 - 11.3 x10*3/uL    nRBC 0.0 0.0 - 0.0 /100 WBCs    RBC 3.90 (L) 4.00 - 5.20 x10*6/uL    Hemoglobin 8.0 (L) 12.0 - 16.0 g/dL    Hematocrit 27.5 (L) 36.0 - 46.0 %    MCV 71 (L) 80 - 100 fL    MCH 20.5 (L) 26.0 - 34.0 pg    MCHC 29.1 (L) 32.0 - 36.0 g/dL    RDW 27.6 (H) 11.5 - 14.5 %    Platelets 263 150 - 450 x10*3/uL    Neutrophils % 66.2 40.0 - 80.0 %    Immature Granulocytes %, Automated 0.4 0.0 - 0.9 %    Lymphocytes % 25.1 13.0 - 44.0 %    Monocytes % 5.9 2.0 - 10.0 %    Eosinophils % 1.9 0.0 - 6.0 %    Basophils % 0.5 0.0 - 2.0 %    Neutrophils Absolute 6.13 1.20 - 7.70 x10*3/uL    Immature Granulocytes Absolute, Automated 0.04 0.00 - 0.70 x10*3/uL    Lymphocytes Absolute 2.33 1.20 - 4.80 x10*3/uL    Monocytes Absolute 0.55 0.10 - 1.00 x10*3/uL    Eosinophils Absolute 0.18 0.00 - 0.70 x10*3/uL    Basophils Absolute 0.05 0.00 - 0.10 x10*3/uL   Comprehensive metabolic panel    Collection Time: 07/27/25  7:47 AM   Result Value Ref Range    Glucose 84 74 - 99 mg/dL    Sodium 139 136 - 145 mmol/L    Potassium 3.6 3.5 - 5.3 mmol/L    Chloride 109 (H) 98 - 107 mmol/L    Bicarbonate 23 21 - 32 mmol/L    Anion Gap 11 10 - 20 mmol/L    Urea Nitrogen 7 6 - 23 mg/dL    Creatinine 0.82 0.50 - 1.05 mg/dL    eGFR >90 >60 mL/min/1.73m*2    Calcium 8.3 (L) 8.6 - 10.6 mg/dL    Albumin 3.5 3.4 - 5.0 g/dL    Alkaline Phosphatase 44 33 - 110 U/L    Total Protein 6.5 6.4 - 8.2 g/dL    AST 15 9 - 39 U/L    Bilirubin,  Total 0.5 0.0 - 1.2 mg/dL    ALT 14 7 - 45 U/L            Past History   Medical History[1]  Surgical History[2]  Social History[3]      Medications/Allergies     Previous Medications    LORATADINE (CLARITIN) 10 MG TABLET    Take 1 tablet (10 mg) by mouth once daily.    XULANE 150-35 MCG/24 HR    Place 1 patch on the skin 1 (one) time per week. apply as directed     Allergies[4]      Review of Systems  All systems reviewed and otherwise negative, except as stated above in HPI.    Diagnostics reviewed by OLVIN Doll-CNP     Labs:  Results for orders placed or performed during the hospital encounter of 07/26/25   Lipid Panel    Collection Time: 07/26/25  8:24 AM   Result Value Ref Range    Cholesterol 131 0 - 199 mg/dL    HDL-Cholesterol 51.9 mg/dL    Cholesterol/HDL Ratio 2.5     LDL Calculated 57 <=99 mg/dL    VLDL 22 0 - 40 mg/dL    Triglycerides 111 0 - 149 mg/dL    Non HDL Cholesterol 79 0 - 149 mg/dL   Hemoglobin A1c    Collection Time: 07/27/25  7:47 AM   Result Value Ref Range    Hemoglobin A1C 5.1 See comment %    Estimated Average Glucose 100 Not Established mg/dL   CBC and Auto Differential    Collection Time: 07/27/25  7:47 AM   Result Value Ref Range    WBC 9.3 4.4 - 11.3 x10*3/uL    nRBC 0.0 0.0 - 0.0 /100 WBCs    RBC 3.90 (L) 4.00 - 5.20 x10*6/uL    Hemoglobin 8.0 (L) 12.0 - 16.0 g/dL    Hematocrit 27.5 (L) 36.0 - 46.0 %    MCV 71 (L) 80 - 100 fL    MCH 20.5 (L) 26.0 - 34.0 pg    MCHC 29.1 (L) 32.0 - 36.0 g/dL    RDW 27.6 (H) 11.5 - 14.5 %    Platelets 263 150 - 450 x10*3/uL    Neutrophils % 66.2 40.0 - 80.0 %    Immature Granulocytes %, Automated 0.4 0.0 - 0.9 %    Lymphocytes % 25.1 13.0 - 44.0 %    Monocytes % 5.9 2.0 - 10.0 %    Eosinophils % 1.9 0.0 - 6.0 %    Basophils % 0.5 0.0 - 2.0 %    Neutrophils Absolute 6.13 1.20 - 7.70 x10*3/uL    Immature Granulocytes Absolute, Automated 0.04 0.00 - 0.70 x10*3/uL    Lymphocytes Absolute 2.33 1.20 - 4.80 x10*3/uL    Monocytes Absolute 0.55 0.10 -  1.00 x10*3/uL    Eosinophils Absolute 0.18 0.00 - 0.70 x10*3/uL    Basophils Absolute 0.05 0.00 - 0.10 x10*3/uL   Comprehensive metabolic panel    Collection Time: 07/27/25  7:47 AM   Result Value Ref Range    Glucose 84 74 - 99 mg/dL    Sodium 139 136 - 145 mmol/L    Potassium 3.6 3.5 - 5.3 mmol/L    Chloride 109 (H) 98 - 107 mmol/L    Bicarbonate 23 21 - 32 mmol/L    Anion Gap 11 10 - 20 mmol/L    Urea Nitrogen 7 6 - 23 mg/dL    Creatinine 0.82 0.50 - 1.05 mg/dL    eGFR >90 >60 mL/min/1.73m*2    Calcium 8.3 (L) 8.6 - 10.6 mg/dL    Albumin 3.5 3.4 - 5.0 g/dL    Alkaline Phosphatase 44 33 - 110 U/L    Total Protein 6.5 6.4 - 8.2 g/dL    AST 15 9 - 39 U/L    Bilirubin, Total 0.5 0.0 - 1.2 mg/dL    ALT 14 7 - 45 U/L     Radiographs:  MR brain w and wo IV contrast   Final Result   MRI Brain:   1. No evidence of acute infarct, intracranial mass effect or midline   shift.        MRI Orbits:   1. MRI orbits is within normal limits.             I personally reviewed the images/study and I agree with the findings   as stated by Dr. Martin Mayorga. This study was interpreted at [place], [place].        MACRO:   None.        Signed by: Katherine Meléndez 7/27/2025 7:44 AM   Dictation workstation:   [id number]      MR orbit w and wo IV contrast   Final Result   MRI Brain:   1. No evidence of acute infarct, intracranial mass effect or midline   shift.        MRI Orbits:   1. MRI orbits is within normal limits.             I personally reviewed the images/study and I agree with the findings   as stated by Dr. Martin Mayorga. This study was interpreted at [place], [place].        MACRO:   None.        Signed by: Katherine Meléndez 7/27/2025 7:44 AM   Dictation workstation:   [id number]      MR angio neck wo IV contrast   Final Result   MRA HEAD AND NECK:        No evidence of major vessel cutoff or significant stenosis on MRA   head and neck. No  "evidence of intracranial aneurysm.             I personally reviewed the images/study and I agree with the findings   as stated by Dr. Martin Mayorga. This study was interpreted at [place], [place].        MACRO:   None        Signed by: Katherine Meléndez 7/27/2025 7:41 AM   Dictation workstation:   [id number]      MR angio head wo IV contrast   Final Result   MRA HEAD AND NECK:        No evidence of major vessel cutoff or significant stenosis on MRA   head and neck. No evidence of intracranial aneurysm.             I personally reviewed the images/study and I agree with the findings   as stated by Dr. Martin Mayorga. This study was interpreted at [place], [place].        MACRO:   None        Signed by: Katherine Meléndez 7/27/2025 7:41 AM   Dictation workstation:   [id number]      Holter or Event Cardiac Monitor    (Results Pending)           Physical Exam     Visit Vitals  /79   Pulse 75   Temp 37.1 °C (98.8 °F) (Temporal)   Resp 18   Ht 1.753 m (5' 9\")   Wt 70.3 kg (155 lb)   SpO2 100%   BMI 22.89 kg/m²   OB Status Having periods   Smoking Status Never   BSA 1.85 m²         Physical Exam:    Appearance: Alert, oriented , cooperative,  in no acute distress.     Skin: Intact,  dry skin, no lesions, rash, petechiae or purpura.     Eyes: PERRLA, EOMs intact.    ENT: Hearing grossly intact. External auditory canals patent, tympanic membranes intact with visible landmarks. Nares patent, mucus membranes moist. Dentition without lesions. Pharynx clear, uvula midline.     Neck: Supple, without meningismus.     Pulmonary: Clear bilaterally with good chest wall excursion. No rales, rhonchi or wheezing. No accessory muscle use or stridor.    Cardiac: Normal S1, S2 without murmur, rub, gallop or extrasystole. No JVD, Carotids without bruits.    Abdomen: Soft, nontender, active bowel sounds.  No palpable organomegaly.  No rebound or " guarding.      Musculoskeletal: Full range of motion. no pain, edema, or deformity. Pulses full and equal. No cyanosis, clubbing, or edema.    Neurological:  Normal sensation, no weakness, no focal findings identified.    Psychiatric: Appropriate mood and affect.     Consultants  1) Ophthalmology  2) Neurology        Impression and Plan    In summary, Charley Saravia has been cared for according to the standard [facility] Clinical Decision Unit observation protocol for Vision loss of right eye. This extended period of observation was specifically required to determine the need for hospitalization. Prior to discharge from observation, the final physical exam is documented above. I have reviewed the results of the labs and imaging that were performed in the ED as well as the CDU.      Significant events during the course of observation based on the goals of the clinical problem list include:   1) Improvement of symptoms  2) Stable vital signs    Based on the patient's condition and test results, the patient will be discharged home.    Date and Time of Disposition.   Discharge: 7/27/25 1057      Dr. Bowen is the CDU disposition attending.  Discussed results and discharge plan with Dr. Bowen    Discharge Diagnosis  Vision loss of right eye  Anemia    Issues Requiring Follow-Up  -Outpatient zio patch order placed (Unable to place today since it is Sunday and they are not available)  - Outpatient echo (Neurology resident confirms they will place the order to follow up)    Discharge Meds     Your medication list        START taking these medications        Instructions Last Dose Given Next Dose Due   docusate sodium 100 mg capsule  Commonly known as: Colace      Take 1 capsule (100 mg) by mouth 2 times a day.       ferrous sulfate 325 mg (65 mg elemental) tablet      Take 1 tablet by mouth 2 times a day.              ASK your doctor about these medications        Instructions Last Dose Given Next  Dose Due   loratadine 10 mg tablet  Commonly known as: Claritin      Take 1 tablet (10 mg) by mouth once daily.       Xulane 150-35 mcg/24 hr  Generic drug: norelgestromin-ethin.estradioL      Place 1 patch on the skin 1 (one) time per week. apply as directed                 Where to Get Your Medications        You can get these medications from any pharmacy    Bring a paper prescription for each of these medications  docusate sodium 100 mg capsule  ferrous sulfate 325 mg (65 mg elemental) tablet         Test Results Pending At Discharge  Pending Labs       Order Current Status    Hemoglobin A1c In process            Hospital Course   Patient remained stable while in the CDU.  Neurology and ophthalmology team was consulted, please see consult note for complete details.  Neurology team did recommend further TIA workup.  Hemoglobin A1c was 5.1.  Lipid panel was within normal limits.  MRI of the brain, MR orbits, MRA angio of the head and MR angio of the neck was obtained.  IMPRESSION:  MRI Brain:  1. No evidence of acute infarct, intracranial mass effect or midline  shift.      MRI Orbits:  1. MRI orbits is within normal limits.  IMPRESSION:  MRA HEAD AND NECK:      No evidence of major vessel cutoff or significant stenosis on MRA  head and neck. No evidence of intracranial aneurysm.  Patient received 2 units of blood at [facility] for anemia.  Hemoglobin was initially 5.9 and increased to 8.2 after the blood transfusion.  Patient currently is on a birth control patch.  Neurology team reevaluated the patient this morning.  They did recommend an outpatient Zio patch which was ordered since it is currently Sunday and the department is not available for placement.  Neurology resident states that they will put the outpatient echo order in since we are unable to obtain an echo today.  Patient did not feel much better while in the emergency room.  Patient was advised to follow-up with a primary care doctor, neurology and  OB/GYN.  Patient was advised to return the emergency room with worsening symptoms.  Pertinent Physical Exam At Time of Discharge  Physical Exam    Outpatient Follow-Up  No future appointments.    Medical Screening Exam: The patient has received a medical screening examination and within reasonable clinical confidence an emergency medical condition was identified and has been stabilized.  Dai Sewell, APRN-CNP                [1]   Past Medical History:  Diagnosis Date    ASCUS with positive high risk HPV cervical 05/21/2024    Atypical squamous cells of undetermined significance on cytologic smear of cervix (ASC-US)     ASCUS on Pap smear   [2] History reviewed. No pertinent surgical history.  [3]   Social History  Socioeconomic History    Marital status: Single   Tobacco Use    Smoking status: Never    Smokeless tobacco: Never   Substance and Sexual Activity    Alcohol use: Yes     Alcohol/week: 0.0 - 1.0 standard drinks of alcohol    Drug use: Not Currently     Types: Marijuana    Sexual activity: Yes     Partners: Male     Birth control/protection: Patch     Social Drivers of Health     Financial Resource Strain: Low Risk  (7/26/2025)    Overall Financial Resource Strain (CARDIA)     Difficulty of Paying Living Expenses: Not hard at all   Food Insecurity: No Food Insecurity (7/26/2025)    Hunger Vital Sign     Worried About Running Out of Food in the Last Year: Never true     Ran Out of Food in the Last Year: Never true   Transportation Needs: No Transportation Needs (7/26/2025)    PRAPARE - Transportation     Lack of Transportation (Medical): No     Lack of Transportation (Non-Medical): No   Physical Activity: Not on File (4/1/2022)    Received from [facility]    Physical Activity     Physical Activity: 0   Stress: Not on File (6/26/2023)    Received from [facility]    Stress     Stress: 0   Social Connections: Not on File (6/26/2023)    Received from [facility]    Social Connections     Connectedness: 0   Intimate Partner  Violence: Not At Risk (7/26/2025)    Humiliation, Afraid, Rape, and Kick questionnaire     Fear of Current or Ex-Partner: No     Emotionally Abused: No     Physically Abused: No     Sexually Abused: No   Housing Stability: Low Risk  (7/26/2025)    Housing Stability Vital Sign     Unable to Pay for Housing in the Last Year: No     Number of Times Moved in the Last Year: 0     Homeless in the Last Year: No   [4] No Known Allergies

## 2025-07-29 ENCOUNTER — PATIENT OUTREACH (OUTPATIENT)
Dept: PRIMARY CARE | Facility: CLINIC | Age: 30
End: 2025-07-29
Payer: MEDICAID

## 2025-07-29 NOTE — PROGRESS NOTES
Discharge Facility: [facility]  Discharge Diagnosis: vision loss of right eye  Admission Date: 7/26/25  Discharge Date:  7/27/25    PCP Appointment Date: TBD - message sent to PCP practice for assistance  Specialist Appointment Date: neurology 1/23/26; ob/gyn 7/31/25  Hospital Encounter and Summary Linked: Yes  ED with Evans Bowen MD MPH (07/26/2025)   See discharge assessment below for further details     Two attempts were made to reach patient within two business days after discharge. Left voicemail with contact information for patient to call back with any non-emergent questions or concerns.

## 2025-07-30 ENCOUNTER — HOSPITAL ENCOUNTER (OUTPATIENT)
Dept: CARDIOLOGY | Facility: HOSPITAL | Age: 30
Discharge: HOME | End: 2025-07-30
Payer: MEDICAID

## 2025-07-30 DIAGNOSIS — H53.121 TRANSIENT VISUAL LOSS OF RIGHT EYE: ICD-10-CM

## 2025-07-30 DIAGNOSIS — G45.8 OTHER TRANSIENT CEREBRAL ISCHEMIC ATTACKS AND RELATED SYNDROMES: ICD-10-CM

## 2025-07-30 LAB
AORTIC VALVE MEAN GRADIENT: 4 MMHG
AORTIC VALVE PEAK VELOCITY: 1.47 M/S
AV PEAK GRADIENT: 9 MMHG
AVA (PEAK VEL): 2.14 CM2
AVA (VTI): 2.28 CM2
EJECTION FRACTION APICAL 4 CHAMBER: 65.9
EJECTION FRACTION: 65 %
LEFT ATRIUM VOLUME AREA LENGTH INDEX BSA: 28 ML/M2
LEFT VENTRICLE INTERNAL DIMENSION DIASTOLE: 5.02 CM (ref 3.5–6)
LEFT VENTRICULAR OUTFLOW TRACT DIAMETER: 2.05 CM
MITRAL VALVE E/A RATIO: 1.92
RIGHT VENTRICLE PEAK SYSTOLIC PRESSURE: 25 MMHG
TRICUSPID ANNULAR PLANE SYSTOLIC EXCURSION: 1.9 CM

## 2025-07-30 PROCEDURE — 93306 TTE W/DOPPLER COMPLETE: CPT

## 2025-07-30 PROCEDURE — 93306 TTE W/DOPPLER COMPLETE: CPT | Performed by: INTERNAL MEDICINE

## 2025-07-31 ENCOUNTER — APPOINTMENT (OUTPATIENT)
Facility: CLINIC | Age: 30
End: 2025-07-31
Payer: MEDICAID

## 2025-08-06 ENCOUNTER — APPOINTMENT (OUTPATIENT)
Dept: PRIMARY CARE | Facility: CLINIC | Age: 30
End: 2025-08-06
Payer: MEDICAID

## 2025-08-06 DIAGNOSIS — D50.0 IRON DEFICIENCY ANEMIA DUE TO CHRONIC BLOOD LOSS: ICD-10-CM

## 2025-08-06 DIAGNOSIS — H53.121 TRANSIENT VISUAL LOSS OF RIGHT EYE: Primary | ICD-10-CM

## 2025-08-06 PROCEDURE — 99214 OFFICE O/P EST MOD 30 MIN: CPT | Performed by: STUDENT IN AN ORGANIZED HEALTH CARE EDUCATION/TRAINING PROGRAM

## 2025-08-06 RX ORDER — FERROUS SULFATE 325(65) MG
325 TABLET ORAL DAILY
Qty: 90 TABLET | Refills: 3 | Status: SHIPPED | OUTPATIENT
Start: 2025-08-06 | End: 2026-08-06

## 2025-08-06 NOTE — PROGRESS NOTES
Subjective   Patient ID: Charley Saravia is a 30 y.o. female who presents for No chief complaint on file..  History of Present Illness  Virtual or Telephone Consent    An interactive audio and video telecommunication system which permits real time communications between the patient (at the originating site) and provider (at the distant site) was utilized to provide this telehealth service.   Verbal consent was requested and obtained from Charley Saravia on this date, 08/06/25 for a telehealth visit and the patient's location was confirmed at the time of the visit.       Charley Saravia is a 30 year old female with iron deficiency anemia who presents with episodes of vision loss in the right eye.    See ED note from ~2 weeks ago. She experiences intermittent vision loss in the right eye, described as going 'blank' or 'black,' with accompanying 'little flashes of light.' These episodes began on July 4th and recurred on July 25th, sometimes resulting in complete vision loss for the entire day. An eye examination revealed swelling in one of the vessels in her eye. An MRI of the brain and neck and an echocardiogram were normal.    Her hemoglobin level was critically low at 5.9 g/dL during an emergency room visit, requiring a transfusion of two units of blood. She attributes this to heavy menstrual bleeding with clots, ongoing for ten days. She is not taking iron supplements. Follow-up appointments with her gynecologist and eye doctor are scheduled for October.      PMHx, FHx, Social Hx, Surg Hx personally reviewed at this appointment. No pertinent findings and/or changes from prior (if applicable).    ROS: Unless specified above, pt denies wt gain/loss f/c HA LoC CP SOB NVDC. See HPI above, and scanned sheet (if applicable). All other systems are reviewed and are without complaint.     Objective     There were no vitals taken for this visit.     Physical Exam  Gen: Well appearing, AAO x 3.   HEENT: NC/AT. Symmetric pupils on  webcam.   Pulm: no evidence of increased work of breathing on webcam  Skin: no blemishes or rashes on webcam     Current Outpatient Medications   Medication Instructions    docusate sodium (COLACE) 100 mg, oral, 2 times daily    ferrous sulfate 325 mg (65 mg elemental) tablet 1 tablet, oral, 2 times daily    loratadine (CLARITIN) 10 mg, oral, Daily    Xulane 150-35 mcg/24 hr 1 patch, transdermal, Once Weekly, apply as directed        Lab Results   Component Value Date    WBC 9.3 07/27/2025    HGB 8.0 (L) 07/27/2025    HCT 27.5 (L) 07/27/2025     07/27/2025    CHOL 131 07/26/2025    TRIG 111 07/26/2025    HDL 51.9 07/26/2025    ALT 14 07/27/2025    AST 15 07/27/2025     07/27/2025    K 3.6 07/27/2025     (H) 07/27/2025    CREATININE 0.82 07/27/2025    BUN 7 07/27/2025    CO2 23 07/27/2025    TSH 0.87 07/17/2019    INR 0.9 05/17/2021    HGBA1C 5.1 07/27/2025     par       Assessment & Plan  Iron deficiency anemia due to heavy menstrual bleeding  Iron deficiency anemia secondary to heavy menstrual bleeding with critically low hemoglobin at 5.9, requiring transfusion. Anemia likely causing transient vision loss.  - Prescribed iron tablets, 90 tablets, daily.  - Order repeat CBC and iron studies in 4-6 weeks.  - Advised follow-up with gynecologist for evaluation of heavy menstrual bleeding.  - Consider hematology referral for iron infusions if no improvement in 4-6 weeks.    Transient right eye vision loss  Transient right eye vision loss, stroke ruled out. Possible ocular migraine or anemia-related. Eye exam showed swollen vessel, no definitive cause.  - Monitor vision symptoms and follow up with eye doctor in October.          Sampson López MD       This medical note was created with the assistance of artificial intelligence (AI) for documentation purposes. The content has been reviewed and confirmed by the healthcare provider for accuracy and completeness. Patient consented to the use of audio  recording and use of AI during their visit.

## 2025-08-12 ENCOUNTER — PATIENT OUTREACH (OUTPATIENT)
Dept: PRIMARY CARE | Facility: CLINIC | Age: 30
End: 2025-08-12
Payer: MEDICAID

## 2025-08-13 ENCOUNTER — PATIENT OUTREACH (OUTPATIENT)
Dept: PRIMARY CARE | Facility: CLINIC | Age: 30
End: 2025-08-13
Payer: MEDICAID

## 2025-08-14 ENCOUNTER — TELEPHONE (OUTPATIENT)
Facility: CLINIC | Age: 30
End: 2025-08-14
Payer: MEDICAID

## 2025-08-14 ENCOUNTER — HOSPITAL ENCOUNTER (OUTPATIENT)
Dept: RADIOLOGY | Facility: CLINIC | Age: 30
End: 2025-08-14
Payer: MEDICAID

## 2025-08-14 DIAGNOSIS — N92.1 MENORRHAGIA WITH IRREGULAR CYCLE: Primary | ICD-10-CM

## 2025-08-15 ENCOUNTER — HOSPITAL ENCOUNTER (OUTPATIENT)
Dept: RADIOLOGY | Facility: CLINIC | Age: 30
Discharge: HOME | End: 2025-08-15
Payer: MEDICAID

## 2025-08-15 DIAGNOSIS — N92.1 MENORRHAGIA WITH IRREGULAR CYCLE: ICD-10-CM

## 2025-08-15 LAB
ALBUMIN SERPL-MCNC: 3.9 G/DL (ref 3.6–5.1)
ALP SERPL-CCNC: 52 U/L (ref 31–125)
ALT SERPL-CCNC: 19 U/L (ref 6–29)
ANION GAP SERPL CALCULATED.4IONS-SCNC: 7 MMOL/L (CALC) (ref 7–17)
AST SERPL-CCNC: 18 U/L (ref 10–30)
BASOPHILS # BLD AUTO: 47 CELLS/UL (ref 0–200)
BASOPHILS NFR BLD AUTO: 0.6 %
BILIRUB SERPL-MCNC: 0.3 MG/DL (ref 0.2–1.2)
BUN SERPL-MCNC: 7 MG/DL (ref 7–25)
CALCIUM SERPL-MCNC: 8.4 MG/DL (ref 8.6–10.2)
CHLORIDE SERPL-SCNC: 108 MMOL/L (ref 98–110)
CO2 SERPL-SCNC: 22 MMOL/L (ref 20–32)
CREAT SERPL-MCNC: 0.92 MG/DL (ref 0.5–0.97)
EGFRCR SERPLBLD CKD-EPI 2021: 85 ML/MIN/1.73M2
EOSINOPHIL # BLD AUTO: 150 CELLS/UL (ref 15–500)
EOSINOPHIL NFR BLD AUTO: 1.9 %
ERYTHROCYTE [DISTWIDTH] IN BLOOD BY AUTOMATED COUNT: 26.5 % (ref 11–15)
FERRITIN SERPL-MCNC: 7 NG/ML (ref 16–154)
GLUCOSE SERPL-MCNC: 94 MG/DL (ref 65–99)
HCT VFR BLD AUTO: 31.4 % (ref 35–45)
HGB BLD-MCNC: 9 G/DL (ref 11.7–15.5)
IRON SATN MFR SERPL: 4 % (CALC) (ref 16–45)
IRON SERPL-MCNC: 25 MCG/DL (ref 40–190)
LYMPHOCYTES # BLD AUTO: 2180 CELLS/UL (ref 850–3900)
LYMPHOCYTES NFR BLD AUTO: 27.6 %
MCH RBC QN AUTO: 21.3 PG (ref 27–33)
MCHC RBC AUTO-ENTMCNC: 28.7 G/DL (ref 32–36)
MCV RBC AUTO: 74.4 FL (ref 80–100)
MONOCYTES # BLD AUTO: 379 CELLS/UL (ref 200–950)
MONOCYTES NFR BLD AUTO: 4.8 %
NEUTROPHILS # BLD AUTO: 5143 CELLS/UL (ref 1500–7800)
NEUTROPHILS NFR BLD AUTO: 65.1 %
PLATELET # BLD AUTO: 103 THOUSAND/UL (ref 140–400)
PMV BLD REES-ECKER: ABNORMAL FL
POTASSIUM SERPL-SCNC: 3.7 MMOL/L (ref 3.5–5.3)
PROT SERPL-MCNC: 6.8 G/DL (ref 6.1–8.1)
RBC # BLD AUTO: 4.22 MILLION/UL (ref 3.8–5.1)
SERVICE CMNT-IMP: ABNORMAL
SODIUM SERPL-SCNC: 137 MMOL/L (ref 135–146)
TIBC SERPL-MCNC: 607 MCG/DL (CALC) (ref 250–450)
WBC # BLD AUTO: 7.9 THOUSAND/UL (ref 3.8–10.8)

## 2025-08-15 PROCEDURE — 76856 US EXAM PELVIC COMPLETE: CPT

## 2025-08-17 ENCOUNTER — RESULTS FOLLOW-UP (OUTPATIENT)
Facility: CLINIC | Age: 30
End: 2025-08-17
Payer: MEDICAID

## 2025-09-04 ENCOUNTER — APPOINTMENT (OUTPATIENT)
Facility: CLINIC | Age: 30
End: 2025-09-04
Payer: MEDICAID

## 2025-10-13 ENCOUNTER — APPOINTMENT (OUTPATIENT)
Facility: CLINIC | Age: 30
End: 2025-10-13
Payer: MEDICAID